# Patient Record
Sex: MALE | Race: WHITE | Employment: FULL TIME | URBAN - NONMETROPOLITAN AREA
[De-identification: names, ages, dates, MRNs, and addresses within clinical notes are randomized per-mention and may not be internally consistent; named-entity substitution may affect disease eponyms.]

---

## 2023-01-25 LAB
ALBUMIN SERPL-MCNC: 4.8 G/DL
ALP BLD-CCNC: 89 U/L
ALT SERPL-CCNC: 20 U/L
ANION GAP SERPL CALCULATED.3IONS-SCNC: 9 MMOL/L
AST SERPL-CCNC: 20 U/L
BILIRUB SERPL-MCNC: 0.6 MG/DL (ref 0.1–1.4)
BUN BLDV-MCNC: 18 MG/DL
CALCIUM SERPL-MCNC: 9.5 MG/DL
CHLORIDE BLD-SCNC: 102 MMOL/L
CO2: 30 MMOL/L
CREAT SERPL-MCNC: 1.1 MG/DL
EGFR: 90
GLUCOSE BLD-MCNC: 94 MG/DL
POTASSIUM SERPL-SCNC: 4.1 MMOL/L
SODIUM BLD-SCNC: 141 MMOL/L
TOTAL PROTEIN: 7.4

## 2023-01-31 LAB
BILIRUBIN, URINE: NEGATIVE
BLOOD, URINE: POSITIVE
CLARITY: CLEAR
COLOR: YELLOW
GLUCOSE URINE: NEGATIVE
KETONES, URINE: NEGATIVE
LEUKOCYTE ESTERASE, URINE: NEGATIVE
NITRITE, URINE: NEGATIVE
PH UA: 5 (ref 4.5–8)
PROTEIN UA: POSITIVE
SPECIFIC GRAVITY, URINE: 1.02
UROBILINOGEN, URINE: NORMAL

## 2023-02-14 LAB
BILIRUBIN, URINE: NEGATIVE
BLOOD, URINE: POSITIVE
CLARITY: CLEAR
COLOR: YELLOW
GLUCOSE URINE: NEGATIVE
KETONES, URINE: NEGATIVE
LEUKOCYTE ESTERASE, URINE: NEGATIVE
NITRITE, URINE: NEGATIVE
PH UA: 7 (ref 4.5–8)
PROTEIN UA: POSITIVE
SPECIFIC GRAVITY, URINE: 1.01
UROBILINOGEN, URINE: NORMAL

## 2023-03-02 ENCOUNTER — HOSPITAL ENCOUNTER (OUTPATIENT)
Dept: ULTRASOUND IMAGING | Age: 28
Discharge: HOME | End: 2023-03-02
Payer: COMMERCIAL

## 2023-03-02 DIAGNOSIS — R31.9 HEMATURIA, UNSPECIFIED TYPE: ICD-10-CM

## 2023-03-02 DIAGNOSIS — R80.9 PROTEINURIA, UNSPECIFIED TYPE: ICD-10-CM

## 2023-03-02 DIAGNOSIS — R03.0 ELEVATED BLOOD PRESSURE READING WITHOUT DIAGNOSIS OF HYPERTENSION: ICD-10-CM

## 2023-03-02 PROCEDURE — 93975 VASCULAR STUDY: CPT

## 2023-03-02 PROCEDURE — 76770 US EXAM ABDO BACK WALL COMP: CPT

## 2023-03-22 LAB
BILIRUBIN, URINE: POSITIVE
BLOOD, URINE: POSITIVE
CLARITY: ABNORMAL
COLOR: YELLOW
GLUCOSE URINE: NEGATIVE
KETONES, URINE: POSITIVE
LEUKOCYTE ESTERASE, URINE: POSITIVE
NITRITE, URINE: NEGATIVE
PH UA: 6.5 (ref 4.5–8)
PROTEIN UA: POSITIVE
SPECIFIC GRAVITY, URINE: 1.02
UROBILINOGEN, URINE: NORMAL

## 2023-03-30 PROBLEM — F32.A DEPRESSIVE DISORDER: Status: ACTIVE | Noted: 2023-03-30

## 2023-03-30 PROBLEM — D17.9 LIPOMA: Status: ACTIVE | Noted: 2023-03-30

## 2023-03-30 PROBLEM — H93.25 AUDITORY PROCESSING DISORDER: Status: ACTIVE | Noted: 2023-03-30

## 2023-03-30 PROBLEM — R31.9 HEMATURIA: Status: ACTIVE | Noted: 2023-03-30

## 2023-03-30 PROBLEM — G47.00 INSOMNIA: Status: ACTIVE | Noted: 2023-03-30

## 2023-03-30 PROBLEM — F84.9 PERVASIVE DEVELOPMENTAL DISORDER: Status: ACTIVE | Noted: 2023-03-30

## 2023-03-30 PROBLEM — R06.83 SNORING: Status: ACTIVE | Noted: 2023-03-30

## 2023-03-30 PROBLEM — R03.0 FINDING OF ABOVE NORMAL BLOOD PRESSURE: Status: ACTIVE | Noted: 2023-03-30

## 2023-03-30 PROBLEM — E78.5 HYPERLIPIDEMIA: Status: ACTIVE | Noted: 2023-03-30

## 2023-03-30 PROBLEM — I10 HYPERTENSION: Status: ACTIVE | Noted: 2023-03-30

## 2023-03-30 PROBLEM — F80.81 STUTTERING: Status: ACTIVE | Noted: 2023-03-30

## 2023-03-30 PROBLEM — R21 RASH: Status: ACTIVE | Noted: 2023-03-30

## 2023-03-30 PROBLEM — R79.89 LOW VITAMIN D LEVEL: Status: ACTIVE | Noted: 2023-03-30

## 2023-03-30 RX ORDER — BUSPIRONE HYDROCHLORIDE 15 MG/1
15 TABLET ORAL 2 TIMES DAILY
COMMUNITY
Start: 2023-02-14

## 2023-03-30 RX ORDER — CLONIDINE HYDROCHLORIDE 0.1 MG/1
0.2 TABLET ORAL 2 TIMES DAILY
COMMUNITY
Start: 2023-01-23

## 2023-03-30 RX ORDER — PAROXETINE HYDROCHLORIDE 20 MG/1
TABLET, FILM COATED ORAL
COMMUNITY
Start: 2023-01-24 | End: 2023-03-31

## 2023-03-30 RX ORDER — DEXTROAMPHETAMINE SACCHARATE, AMPHETAMINE ASPARTATE, DEXTROAMPHETAMINE SULFATE AND AMPHETAMINE SULFATE 3.75; 3.75; 3.75; 3.75 MG/1; MG/1; MG/1; MG/1
TABLET ORAL
COMMUNITY
End: 2023-03-31

## 2023-03-30 RX ORDER — LISINOPRIL 10 MG/1
TABLET ORAL
COMMUNITY
Start: 2023-01-24 | End: 2023-03-31

## 2023-03-30 RX ORDER — AMLODIPINE BESYLATE 5 MG/1
TABLET ORAL 2 TIMES DAILY
COMMUNITY
Start: 2023-01-31 | End: 2023-09-17

## 2023-03-30 RX ORDER — HYDROXYZINE PAMOATE 25 MG/1
CAPSULE ORAL
COMMUNITY
Start: 2022-03-22 | End: 2023-03-31

## 2023-03-31 ENCOUNTER — OFFICE VISIT (OUTPATIENT)
Dept: NEPHROLOGY | Age: 28
End: 2023-03-31
Payer: COMMERCIAL

## 2023-03-31 VITALS
HEART RATE: 97 BPM | SYSTOLIC BLOOD PRESSURE: 155 MMHG | WEIGHT: 215 LBS | DIASTOLIC BLOOD PRESSURE: 103 MMHG | OXYGEN SATURATION: 100 %

## 2023-03-31 DIAGNOSIS — R79.89 ELEVATED SERUM CREATININE: ICD-10-CM

## 2023-03-31 DIAGNOSIS — R76.8 POSITIVE ANA (ANTINUCLEAR ANTIBODY): ICD-10-CM

## 2023-03-31 DIAGNOSIS — R80.9 PROTEINURIA, UNSPECIFIED TYPE: ICD-10-CM

## 2023-03-31 DIAGNOSIS — R31.29 MICROHEMATURIA: ICD-10-CM

## 2023-03-31 DIAGNOSIS — I10 ESSENTIAL HYPERTENSION: Primary | ICD-10-CM

## 2023-03-31 PROCEDURE — 3080F DIAST BP >= 90 MM HG: CPT | Performed by: INTERNAL MEDICINE

## 2023-03-31 PROCEDURE — 99204 OFFICE O/P NEW MOD 45 MIN: CPT | Performed by: INTERNAL MEDICINE

## 2023-03-31 PROCEDURE — 3077F SYST BP >= 140 MM HG: CPT | Performed by: INTERNAL MEDICINE

## 2023-03-31 RX ORDER — LOSARTAN POTASSIUM 50 MG/1
50 TABLET ORAL DAILY
Qty: 90 TABLET | Refills: 1 | Status: SHIPPED | OUTPATIENT
Start: 2023-03-31

## 2023-03-31 NOTE — PROGRESS NOTES
distress, not diaphoretic  Eyes: no icteric sclera in left eye or right eye,  no pallor conjunctiva in left or right eye, no discharge seen from left eye or right eye  Ears and Nose: normal external appearance of left and right ear  Oral: moist oral mucus membranes  Neck: No jugular venous distention, appears symmetric, good ROM  Lungs: Air entry B/L, no crackles or rales  Chest: No chest wall tenderness  Heart: regular rate, S1, S2  Extremities: no LE edema, no tenderness  GI: soft, non-tender, no guarding, no distention  Skin: no rash seen on exposed extremities, warm to touch  Musculo: moves all extremities  Neuro: no slurred speech, no facial drooping  Psychiatric: Normal mood and affect, Not agitated     Laboratory & Diagnostics:  Old progress notes from referring physician reviewed. Radiology/US kidneys:   Echo:   Old labs reviewed:    Jan 2023: K 4.1, Creat 1.1, Albumin 4.8  UA; + large blood, ++ protein, NHUNG (+)     Impression/Plan:   1. HTN: sub-optimally controlled in setting of dipstick proteinuria and  hematuria. NHUNG is positive. Check full serological panel. Check Complements, Anti DsDNA, Anti smith, ANCA, GBM. Check Dopplers renal arteries. Advised low salt diet, weight loss. Increase water intake. Add losartan 50 mg daily. Bring me some BP readings. 2. Positive NHUNG with hematuria and proteinuria: Check full serological panel as above. Patient with positive NHUNG. No know hx lupus. Denies any join pain. Check ESR and CRP  3. Elevated serum creatinine: ?GN vs HTN nephrosclerosis. Has proteinuria, microhematuria and (+) NHUNG. Needs full work-up as above.        Thought process was discussed with the patient  Thank you for the referral  Please do not hesitate to contact me if you have any questions or concerns  I will make further recommendations depending on clinical course and lab/diagnostic results    Orders Placed This Encounter   Procedures    US DUP ABD PEL RETRO SCROT COMPLETE    Basic Metabolic

## 2023-05-12 ENCOUNTER — OFFICE VISIT (OUTPATIENT)
Dept: NEPHROLOGY | Age: 28
End: 2023-05-12
Payer: COMMERCIAL

## 2023-05-12 VITALS
HEART RATE: 91 BPM | OXYGEN SATURATION: 97 % | DIASTOLIC BLOOD PRESSURE: 88 MMHG | WEIGHT: 212 LBS | SYSTOLIC BLOOD PRESSURE: 139 MMHG

## 2023-05-12 DIAGNOSIS — R76.8 ANA POSITIVE: ICD-10-CM

## 2023-05-12 DIAGNOSIS — R31.9 HEMATURIA, UNSPECIFIED TYPE: Primary | ICD-10-CM

## 2023-05-12 DIAGNOSIS — R80.9 PROTEINURIA, UNSPECIFIED TYPE: ICD-10-CM

## 2023-05-12 DIAGNOSIS — I10 ESSENTIAL HYPERTENSION: ICD-10-CM

## 2023-05-12 PROCEDURE — 99214 OFFICE O/P EST MOD 30 MIN: CPT | Performed by: INTERNAL MEDICINE

## 2023-05-12 PROCEDURE — 3074F SYST BP LT 130 MM HG: CPT | Performed by: INTERNAL MEDICINE

## 2023-05-12 PROCEDURE — 3078F DIAST BP <80 MM HG: CPT | Performed by: INTERNAL MEDICINE

## 2023-05-12 RX ORDER — PAROXETINE 10 MG/1
10 TABLET, FILM COATED ORAL EVERY MORNING
COMMUNITY
Start: 2023-04-04

## 2023-05-12 RX ORDER — ERGOCALCIFEROL 1.25 MG/1
50000 CAPSULE ORAL WEEKLY
Qty: 12 CAPSULE | Refills: 0 | Status: SHIPPED | OUTPATIENT
Start: 2023-05-12

## 2023-05-12 RX ORDER — CARVEDILOL 6.25 MG/1
6.25 TABLET ORAL 2 TIMES DAILY
Qty: 60 TABLET | Refills: 3 | Status: SHIPPED | OUTPATIENT
Start: 2023-05-12

## 2023-05-12 NOTE — PROGRESS NOTES
[address] [facility] [address]  Dept: [phone]  Loc: [phone]  Office Progress Note  5/12/2023 12:38 PM      Pt Name:    Segundo Burroughs  YOB: 1995  Primary Care Physician:  Elysia Mullen MD     Chief Complaint:   Chief Complaint   Patient presents with    Follow-up     6 week for hematuria, proteinuria. Background Information/Interval History:   The patient is a 33 y.o. white malepatient who was sent in for evaluation of proteinuria and hematuria. Patient reports history of hypertension for few years. He says atleast 2 years. Says he has been on HTN medications atleast for over a year. Clonidine was started about a year ago. He says mother has high BP. He is on clonidine and norvasc (norvasc was added 2 weeks ago). Had some tests including positive NHUNG. here for follow-up visit. He has no new concerns. No chest pain or shortness of breath. Denies any gross hematuria. No fever or chills. Feels okay otherwise. Past History:  Past Medical History:   Diagnosis Date    Diabetes mellitus ([address]     Hypertension      Past Surgical History:   Procedure Laterality Date    TONGUE SURGERY          VITALS:  /88 (Site: Left Upper Arm, Position: Sitting, Cuff Size: Medium Adult)   Pulse 91   Wt 212 lb (96.2 kg)   SpO2 97%   Wt Readings from Last 3 Encounters:   05/12/23 212 lb (96.2 kg)   03/31/23 215 lb (97.5 kg)     There is no height or weight on file to calculate BMI.      General Appearance: alert and cooperative with exam, appears comfortable, no distress  Oral: moist oral mucus membranes  Neck: No jugular venous distention  Lungs: Air entry B/L, no crackles or rales, no use of accessory muscles  Heart: S1, S2 heard  GI: soft, non-tender, no guarding  Extremities: No sig LE edema     Medications:  Current Outpatient Medications   Medication Sig Dispense Refill    PARoxetine (PAXIL) 10 MG tablet

## 2023-05-23 ENCOUNTER — HOSPITAL ENCOUNTER (OUTPATIENT)
Dept: CT IMAGING | Age: 28
Discharge: HOME | End: 2023-05-23
Payer: COMMERCIAL

## 2023-05-23 VITALS
TEMPERATURE: 98.7 F | BODY MASS INDEX: 35.36 KG/M2 | WEIGHT: 220 LBS | HEIGHT: 66 IN | RESPIRATION RATE: 18 BRPM | DIASTOLIC BLOOD PRESSURE: 70 MMHG | SYSTOLIC BLOOD PRESSURE: 134 MMHG | HEART RATE: 90 BPM | OXYGEN SATURATION: 95 %

## 2023-05-23 DIAGNOSIS — R80.9 PROTEINURIA, UNSPECIFIED TYPE: ICD-10-CM

## 2023-05-23 DIAGNOSIS — I10 ESSENTIAL HYPERTENSION: ICD-10-CM

## 2023-05-23 DIAGNOSIS — R31.9 HEMATURIA, UNSPECIFIED TYPE: ICD-10-CM

## 2023-05-23 LAB
ANION GAP SERPL CALC-SCNC: 12 MEQ/L (ref 8–16)
BACTERIA: ABNORMAL
BILIRUB UR QL STRIP: NEGATIVE
BUN SERPL-MCNC: 22 MG/DL (ref 7–22)
CALCIUM SERPL-MCNC: 9.3 MG/DL (ref 8.5–10.5)
CASTS #/AREA URNS LPF: ABNORMAL /LPF
CASTS #/AREA URNS LPF: ABNORMAL /LPF
CHARACTER UR: CLEAR
CHARCOAL URNS QL MICRO: ABNORMAL
CHLORIDE SERPL-SCNC: 106 MEQ/L (ref 98–111)
CO2 SERPL-SCNC: 24 MEQ/L (ref 23–33)
COLOR UR: YELLOW
CREAT SERPL-MCNC: 1.1 MG/DL (ref 0.4–1.2)
CREAT UR-MCNC: 135 MG/DL
CRYSTALS URNS QL MICRO: ABNORMAL
DEPRECATED RDW RBC AUTO: 37.1 FL (ref 35–45)
EPITHELIAL CELLS, UA: ABNORMAL /HPF
ERYTHROCYTE [DISTWIDTH] IN BLOOD BY AUTOMATED COUNT: 12.3 % (ref 11.5–14.5)
GFR SERPL CREATININE-BSD FRML MDRD: > 60 ML/MIN/1.73M2
GLUCOSE SERPL-MCNC: 117 MG/DL (ref 70–108)
GLUCOSE UR QL STRIP.AUTO: NEGATIVE MG/DL
HCT VFR BLD AUTO: 47.6 % (ref 42–52)
HGB BLD-MCNC: 15.9 GM/DL (ref 14–18)
HGB UR QL STRIP.AUTO: ABNORMAL
KETONES UR QL STRIP.AUTO: NEGATIVE
LEUKOCYTE ESTERASE UR QL STRIP.AUTO: NEGATIVE
MCH RBC QN AUTO: 28 PG (ref 26–33)
MCHC RBC AUTO-ENTMCNC: 33.4 GM/DL (ref 32.2–35.5)
MCV RBC AUTO: 83.8 FL (ref 80–94)
NITRITE UR QL STRIP.AUTO: NEGATIVE
PH UR STRIP.AUTO: 5.5 [PH] (ref 5–9)
PLATELET # BLD AUTO: 302 THOU/MM3 (ref 130–400)
PMV BLD AUTO: 9 FL (ref 9.4–12.4)
POTASSIUM SERPL-SCNC: 4.2 MEQ/L (ref 3.5–5.2)
PROT UR STRIP.AUTO-MCNC: 300 MG/DL
PROT UR-MCNC: 139.7 MG/DL
PROT/CREAT 24H UR: 1.03 MG/G{CREAT}
RBC # BLD AUTO: 5.68 MILL/MM3 (ref 4.7–6.1)
RBC #/AREA URNS HPF: ABNORMAL /HPF
RENAL EPI CELLS #/AREA URNS HPF: ABNORMAL /[HPF]
SODIUM SERPL-SCNC: 142 MEQ/L (ref 135–145)
SP GR UR REFRACT.AUTO: 1.02 (ref 1–1.03)
UROBILINOGEN UR QL STRIP.AUTO: 0.2 EU/DL (ref 0–1)
WBC # BLD AUTO: 7.3 THOU/MM3 (ref 4.8–10.8)
WBC #/AREA URNS HPF: ABNORMAL /HPF
YEAST LIKE FUNGI URNS QL MICRO: ABNORMAL

## 2023-05-23 PROCEDURE — 2580000003 HC RX 258: Performed by: RADIOLOGY

## 2023-05-23 PROCEDURE — 77012 CT SCAN FOR NEEDLE BIOPSY: CPT

## 2023-05-23 PROCEDURE — 50200 RENAL BIOPSY PERQ: CPT

## 2023-05-23 PROCEDURE — 6360000002 HC RX W HCPCS: Performed by: RADIOLOGY

## 2023-05-23 PROCEDURE — 99211 OFF/OP EST MAY X REQ PHY/QHP: CPT

## 2023-05-23 RX ORDER — MIDAZOLAM HYDROCHLORIDE 1 MG/ML
1 INJECTION INTRAMUSCULAR; INTRAVENOUS ONCE
Status: COMPLETED | OUTPATIENT
Start: 2023-05-23 | End: 2023-05-23

## 2023-05-23 RX ORDER — FENTANYL CITRATE 50 UG/ML
50 INJECTION, SOLUTION INTRAMUSCULAR; INTRAVENOUS ONCE
Status: COMPLETED | OUTPATIENT
Start: 2023-05-23 | End: 2023-05-23

## 2023-05-23 RX ORDER — SODIUM CHLORIDE 450 MG/100ML
INJECTION, SOLUTION INTRAVENOUS CONTINUOUS
Status: DISCONTINUED | OUTPATIENT
Start: 2023-05-23 | End: 2023-05-24 | Stop reason: HOSPADM

## 2023-05-23 RX ADMIN — FENTANYL CITRATE 50 MCG: 0.05 INJECTION, SOLUTION INTRAMUSCULAR; INTRAVENOUS at 08:39

## 2023-05-23 RX ADMIN — MIDAZOLAM 1 MG: 1 INJECTION INTRAMUSCULAR; INTRAVENOUS at 08:40

## 2023-05-23 RX ADMIN — SODIUM CHLORIDE: 4.5 INJECTION, SOLUTION INTRAVENOUS at 07:24

## 2023-05-23 ASSESSMENT — PAIN SCALES - GENERAL
PAINLEVEL_OUTOF10: 0
PAINLEVEL_OUTOF10: 5
PAINLEVEL_OUTOF10: 0
PAINLEVEL_OUTOF10: 0
PAINLEVEL_OUTOF10: 2

## 2023-05-23 ASSESSMENT — PAIN DESCRIPTION - LOCATION
LOCATION: HEAD
LOCATION: HEAD

## 2023-05-23 NOTE — PROGRESS NOTES
0700 pt arrives ambulatory with  for renal bx. Procedure explained and questions answered. PT RIGHTS AND RESPONSIBILITIES OFFERED TO PT. Pt denies taking blood thinners for past 5 days. Pt has been NPO since 5/22/23.  0725 labs drawn and urine sample sent down as ordered. 0800 pt to CT via bed.  0920 pt back from ct. Vitals stable. Pt denies pain at bx site. Site on left back lateral bandaid clean, dry and intact. No bleeding drainage redness or swelling noted. Pt provided with muffin and water. 0945 vitals stable. Site unchanged. Site soft and non-tender. 1000 vitals stable. Site unchanged. Pt tolerating snack.  at bedside. 1024 vitals stable. Site unchanged. Pt denies needs. Pt denies pain. 1120 vitals stable. Pt denies needs. 1230 pt resting quietly. Denies needs. Vitals stable. Site unchanged. Pt denies need to use restroom. [address] Carlos Daniel in to see pt. Okay to discharge. 1315 pt discharged via wheelchair with  with instructions with no complaints. Bx site on left lateral back clean, dry and intact. No bleeding drainage redness or swelling noted.                  __m__ Safety:       (Environmental)  [place] to environment  Ensure ID band is correct and in place/ allergy band as needed  Assess for fall risk  Initiate fall precautions as applicable (fall band, side rails, etc.)  Call light within reach  Bed in low position/ wheels locked    __m__ Pain:       Assess pain level and characteristics  Administer analgesics as ordered  Assess effectiveness of pain management and report to MD as needed    __m__ Knowledge Deficit:  Assess baseline knowledge  Provide teaching at level of understanding  Provide teaching via preferred learning method  Evaluate teaching effectiveness    __m__ Hemodynamic/Respiratory Status:       (Pre and Post Procedure Monitoring)  Assess/Monitor vital signs and LOC  Assess Baseline SpO2 prior to any sedation  Obtain weight/height  Assess vital signs/ LOC

## 2023-05-23 NOTE — POST SEDATION
Sedation Post Procedure Note    Patient Name: Leva Hatchet   YOB: 1995  Room/Bed: Room/bed info not found  Medical Record Number: [id number]  Date: 5/23/2023   Time: 9:18 AM         Physicians/Assistants: Pascale Abarca DO, MD    Procedure Performed:  CT guided random renal biopsy left kidney    Post-Sedation Vital Signs:  Vitals:    05/23/23 0914   BP: (!) 155/97   Pulse: 96   Resp: 22   Temp:    SpO2: 97%      Vital signs were reviewed and were stable after the procedure (see flow sheet for vitals)            Post-Sedation Exam: stable           Complications: none    Electronically signed by Pascale Abarca DO on 5/23/2023 at 9:18 AM

## 2023-05-23 NOTE — PROGRESS NOTES
8700 Patient received in CT scanning for pre-procedure assessment. Monitor applied. 6011 Dr. Ian Rosenbaum here; spoke to patient. This procedure has been fully reviewed with the patient and written informed consent has been obtained. 7760 Patient assisted to CT table and pre scan started. 7939 Procedure started with Dr. Ian Rosenbaum. 7017 Samples collected and given to pathologist for further review. Surgigel slurry injected in needle tract. 8466 Procedure completed; patient tolerated well. Post scan obtained. 0912 Bacitracin oint, band aid to site; no bleeding noted. [id number] Patient on cart; comfort ensured. 0730 Report called to OPN and patient taken to OPN via cart.

## 2023-05-23 NOTE — H&P
[address] [address]  Sedation/Analgesia History & Physical    Pt Name: Perla Leigh  MRN: [id number]  YOB: 1995  Provider Performing Procedure: Viky Carrera MD  Primary Care Physician: Sadia Rowley MD    Formulation and discussion of sedation / procedure plans, risks, benefits, side effects and alternatives with patient and/or responsible adult completed. PRE-PROCEDURE   DNR-CCA/DNR-CC []Yes [x]No  Brief History/Pre-Procedure Diagnosis: Proteinuria/hematuria          MEDICAL HISTORY  []CAD/Valve  []Liver Disease  []Lung Disease [x]Diabetes  [x]Hypertension []Renal Disease  []Additional information:       has a past medical history of Diabetes mellitus ([facility] [address] and Hypertension. SURGICAL HISTORY   has a past surgical history that includes Tongue surgery.   Additional information:       ALLERGIES   Allergies as of 05/23/2023    (No Known Allergies)     Additional information:       MEDICATIONS   Coumadin Use Last 5 Days [x]No []Yes  Antiplatelet drug therapy use last 5 days  [x]No []Yes  Other anticoagulant use last 5 days  [x]No []Yes    Current Outpatient Medications:     PARoxetine (PAXIL) 10 MG tablet, Take 1 tablet by mouth every morning, Disp: , Rfl:     carvedilol (COREG) 6.25 MG tablet, Take 1 tablet by mouth 2 times daily, Disp: 60 tablet, Rfl: 3    vitamin D (ERGOCALCIFEROL) 1.25 MG (06229 UT) CAPS capsule, Take 1 capsule by mouth once a week, Disp: 12 capsule, Rfl: 0    losartan (COZAAR) 50 MG tablet, Take 1 tablet by mouth daily (Patient taking differently: Take 2 tablets by mouth daily), Disp: 90 tablet, Rfl: 1    busPIRone (BUSPAR) 15 MG tablet, Take 15 mg by mouth in the morning and at bedtime, Disp: , Rfl:     cloNIDine (CATAPRES) 0.1 MG tablet, Take 2 tablets by mouth 2 times daily, Disp: , Rfl:     Current Facility-Administered Medications:     0.45 % sodium chloride infusion, , IntraVENous, Continuous, Juma Ramsey DO, Last Rate: 20 mL/hr at 05/23/23 0724, New

## 2023-05-25 NOTE — PROGRESS NOTES
[address] [place] [facility] pt for follow-up CT guided random renal biopsy. Pt states he is doing well. Denies any problems at biopsy site and states he is just having a little pain in his lower back. Offers no complaints.

## 2023-05-31 LAB — MISC REFERENCE: NORMAL

## 2023-06-06 ENCOUNTER — TELEPHONE (OUTPATIENT)
Dept: NEPHROLOGY | Age: 28
End: 2023-06-06

## 2023-06-06 NOTE — TELEPHONE ENCOUNTER
----- Message from Sudha Justice MD sent at 6/4/2023  6:20 PM EDT -----  Can you please call renal pathology dept [facility] and obtain another copy of kidney biopsy report? The one in chart is missing some pages.    Phone [phone]

## 2023-06-07 ENCOUNTER — TELEPHONE (OUTPATIENT)
Dept: NEPHROLOGY | Age: 28
End: 2023-06-07

## 2023-06-07 ENCOUNTER — OFFICE VISIT (OUTPATIENT)
Dept: NEPHROLOGY | Age: 28
End: 2023-06-07
Payer: COMMERCIAL

## 2023-06-07 VITALS
DIASTOLIC BLOOD PRESSURE: 81 MMHG | OXYGEN SATURATION: 98 % | WEIGHT: 211 LBS | HEART RATE: 81 BPM | SYSTOLIC BLOOD PRESSURE: 120 MMHG | BODY MASS INDEX: 34.06 KG/M2

## 2023-06-07 DIAGNOSIS — I10 PRIMARY HYPERTENSION: ICD-10-CM

## 2023-06-07 DIAGNOSIS — N02.8 IGA NEPHROPATHY DETERMINED BY BIOPSY OF KIDNEY: Primary | ICD-10-CM

## 2023-06-07 LAB
ALBUMIN SERPL-MCNC: 4.4 G/DL
ALP BLD-CCNC: 91 U/L
ALT SERPL-CCNC: 11 U/L
ANION GAP SERPL CALCULATED.3IONS-SCNC: 9 MMOL/L
AST SERPL-CCNC: 12 U/L
BASOPHILS ABSOLUTE: NORMAL
BASOPHILS RELATIVE PERCENT: NORMAL
BILIRUB SERPL-MCNC: 0.3 MG/DL (ref 0.1–1.4)
BILIRUBIN, URINE: NEGATIVE
BLOOD, URINE: POSITIVE
BUN BLDV-MCNC: 19 MG/DL
CALCIUM SERPL-MCNC: 9.3 MG/DL
CHLORIDE BLD-SCNC: 103 MMOL/L
CLARITY: CLEAR
CO2: 28 MMOL/L
COLOR: YELLOW
CREAT SERPL-MCNC: 1.1 MG/DL
EGFR: >90
EOSINOPHILS ABSOLUTE: NORMAL
EOSINOPHILS RELATIVE PERCENT: NORMAL
GLUCOSE BLD-MCNC: 98 MG/DL
GLUCOSE URINE: NEGATIVE
HCT VFR BLD CALC: 42.7 % (ref 41–53)
HEMOGLOBIN: 14.6 G/DL (ref 13.5–17.5)
KETONES, URINE: NEGATIVE
LEUKOCYTE ESTERASE, URINE: NEGATIVE
LYMPHOCYTES ABSOLUTE: NORMAL
LYMPHOCYTES RELATIVE PERCENT: NORMAL
MCH RBC QN AUTO: NORMAL PG
MCHC RBC AUTO-ENTMCNC: NORMAL G/DL
MCV RBC AUTO: NORMAL FL
MONOCYTES ABSOLUTE: NORMAL
MONOCYTES RELATIVE PERCENT: NORMAL
NEUTROPHILS ABSOLUTE: NORMAL
NEUTROPHILS RELATIVE PERCENT: NORMAL
NITRITE, URINE: NEGATIVE
PDW BLD-RTO: NORMAL %
PH UA: 5.5 (ref 4.5–8)
PLATELET # BLD: 304 K/ΜL
PMV BLD AUTO: NORMAL FL
POTASSIUM SERPL-SCNC: 4.2 MMOL/L
PROTEIN UA: POSITIVE
RBC # BLD: 5.19 10^6/ΜL
SODIUM BLD-SCNC: 140 MMOL/L
SPECIFIC GRAVITY, URINE: >1.03
TOTAL PROTEIN: 6.9
UROBILINOGEN, URINE: ABNORMAL
WBC # BLD: 6.9 10^3/ML

## 2023-06-07 PROCEDURE — 3074F SYST BP LT 130 MM HG: CPT | Performed by: INTERNAL MEDICINE

## 2023-06-07 PROCEDURE — 99214 OFFICE O/P EST MOD 30 MIN: CPT | Performed by: INTERNAL MEDICINE

## 2023-06-07 PROCEDURE — 3078F DIAST BP <80 MM HG: CPT | Performed by: INTERNAL MEDICINE

## 2023-06-07 RX ORDER — CARVEDILOL 12.5 MG/1
12.5 TABLET ORAL 2 TIMES DAILY
Qty: 60 TABLET | Refills: 3 | Status: SHIPPED | OUTPATIENT
Start: 2023-06-07

## 2023-06-07 RX ORDER — LOSARTAN POTASSIUM 100 MG/1
100 TABLET ORAL DAILY
Qty: 90 TABLET | Refills: 0 | COMMUNITY
Start: 2023-06-07

## 2023-06-07 NOTE — PROGRESS NOTES
[address]  [address]  Dept: [phone]  Loc: [phone]  Office Progress Note  6/7/2023 1:18 PM      Pt Name:    Ca Padron  YOB: 1995  Primary Care Physician:  Susannah Voss MD     Chief Complaint:   Chief Complaint   Patient presents with    Follow-up     for hematuria, proteinuria, review kidney biopsy results. Background Information/Interval History:   The patient is a 33 y.o. white malepatient with proteinuria and hematuria. Patient reports history of hypertension for few years. He says atleast 2 years. He says mother has high BP. Here for follow-up. Feels well. No nausea, fever or chills. No gross hematuria. No leg swelling. Says BP has been in 135-153/95 range. Underwent kidney biopsy recently and now here to discuss further plan of care. Past History:  Past Medical History:   Diagnosis Date    Diabetes mellitus ([address]     Hypertension      Past Surgical History:   Procedure Laterality Date    CT BIOPSY RENAL  5/23/2023    CT BIOPSY RENAL 5/23/2023 [facility] CT SCAN    TONGUE SURGERY          VITALS:  /81   Pulse 81   Wt 211 lb (95.7 kg)   SpO2 98%   BMI 34.06 kg/m²   Wt Readings from Last 3 Encounters:   06/07/23 211 lb (95.7 kg)   05/23/23 220 lb (99.8 kg)   05/12/23 212 lb (96.2 kg)     Body mass index is 34.06 kg/m².      General Appearance: alert and cooperative with exam, appears comfortable, no distress  Oral: moist oral mucus membranes  Neck: No jugular venous distention  Lungs: Air entry B/L, no crackles or rales, no use of accessory muscles  Heart: S1, S2 heard  GI: soft, non-tender, no guarding  Extremities: No sig LE edema     Medications:  Current Outpatient Medications   Medication Sig Dispense Refill    PARoxetine (PAXIL) 10 MG tablet Take 2 tablets by mouth every morning      carvedilol (COREG) 6.25 MG tablet Take 1 tablet by mouth 2 times

## 2023-06-08 ENCOUNTER — TELEPHONE (OUTPATIENT)
Dept: NEPHROLOGY | Age: 28
End: 2023-06-08

## 2023-06-08 DIAGNOSIS — N02.8 IGA NEPHROPATHY: Primary | ICD-10-CM

## 2023-06-08 RX ORDER — PREDNISONE 50 MG/1
60 TABLET ORAL DAILY
COMMUNITY
End: 2023-06-08 | Stop reason: DRUGHIGH

## 2023-06-08 RX ORDER — CALCIUM CARBONATE 500(1250)
500 TABLET ORAL 2 TIMES DAILY
COMMUNITY
End: 2023-06-08 | Stop reason: SDUPTHER

## 2023-06-08 RX ORDER — FAMOTIDINE 20 MG/1
20 TABLET, FILM COATED ORAL DAILY
COMMUNITY
End: 2023-06-08 | Stop reason: SDUPTHER

## 2023-06-08 RX ORDER — FAMOTIDINE 20 MG/1
20 TABLET, FILM COATED ORAL DAILY
Qty: 30 TABLET | Refills: 3 | Status: SHIPPED | OUTPATIENT
Start: 2023-06-08

## 2023-06-08 RX ORDER — PREDNISONE 50 MG/1
TABLET ORAL
Qty: 30 TABLET | Refills: 3 | Status: CANCELLED | OUTPATIENT
Start: 2023-06-08

## 2023-06-08 RX ORDER — PREDNISONE 20 MG/1
60 TABLET ORAL DAILY
Qty: 90 TABLET | Refills: 1 | Status: SHIPPED | OUTPATIENT
Start: 2023-06-08

## 2023-06-08 RX ORDER — CALCIUM CARBONATE 500(1250)
500 TABLET ORAL 2 TIMES DAILY
Qty: 60 TABLET | Refills: 3 | Status: SHIPPED | OUTPATIENT
Start: 2023-06-08

## 2023-06-08 NOTE — TELEPHONE ENCOUNTER
----- Message from Juan Valdez MD sent at 6/8/2023 12:54 PM EDT -----  Start prednisone 60 mg daily  Add oscal 500 mg BID  Add pepcid 20 mg daily  Check BMP, UA, UPCR in 2 weeks.

## 2023-06-08 NOTE — TELEPHONE ENCOUNTER
Spoke to pt, he understood the medication changes and to do labs in 2 weeks.     Lab orders and script pending

## 2023-06-08 NOTE — RESULT ENCOUNTER NOTE
Start prednisone 60 mg daily  Add oscal 500 mg BID  Add pepcid 20 mg daily  Check BMP, UA, UPCR in 2 weeks.

## 2023-06-28 LAB
BILIRUBIN, URINE: NEGATIVE
BLOOD, URINE: POSITIVE
BUN BLDV-MCNC: 24 MG/DL
CALCIUM SERPL-MCNC: 9.4 MG/DL
CHLORIDE BLD-SCNC: 101 MMOL/L
CLARITY: CLEAR
CO2: 31 MMOL/L
COLOR: YELLOW
CREAT SERPL-MCNC: 1.1 MG/DL
EGFR: >=90
GLUCOSE BLD-MCNC: 84 MG/DL
GLUCOSE URINE: ABNORMAL
KETONES, URINE: NEGATIVE
LEUKOCYTE ESTERASE, URINE: NEGATIVE
NITRITE, URINE: NEGATIVE
PH UA: 6 (ref 4.5–8)
POTASSIUM SERPL-SCNC: 3.6 MMOL/L
PROTEIN UA: POSITIVE
SODIUM BLD-SCNC: 144 MMOL/L
SPECIFIC GRAVITY, URINE: >=1.03
UROBILINOGEN, URINE: NORMAL

## 2023-07-05 ENCOUNTER — OFFICE VISIT (OUTPATIENT)
Dept: NEPHROLOGY | Age: 28
End: 2023-07-05
Payer: COMMERCIAL

## 2023-07-05 VITALS
OXYGEN SATURATION: 98 % | HEART RATE: 94 BPM | SYSTOLIC BLOOD PRESSURE: 143 MMHG | DIASTOLIC BLOOD PRESSURE: 100 MMHG | WEIGHT: 221 LBS | BODY MASS INDEX: 35.67 KG/M2

## 2023-07-05 DIAGNOSIS — N02.8 IGA NEPHROPATHY: Primary | ICD-10-CM

## 2023-07-05 DIAGNOSIS — I10 PRIMARY HYPERTENSION: ICD-10-CM

## 2023-07-05 PROCEDURE — 3074F SYST BP LT 130 MM HG: CPT | Performed by: INTERNAL MEDICINE

## 2023-07-05 PROCEDURE — 99214 OFFICE O/P EST MOD 30 MIN: CPT | Performed by: INTERNAL MEDICINE

## 2023-07-05 PROCEDURE — 3078F DIAST BP <80 MM HG: CPT | Performed by: INTERNAL MEDICINE

## 2023-07-05 RX ORDER — CARVEDILOL 25 MG/1
25 TABLET ORAL 2 TIMES DAILY
Qty: 180 TABLET | Refills: 3 | Status: SHIPPED | OUTPATIENT
Start: 2023-07-05

## 2023-07-05 RX ORDER — PREDNISONE 20 MG/1
40 TABLET ORAL DAILY
Qty: 90 TABLET | Refills: 1 | Status: SHIPPED
Start: 2023-07-05

## 2023-07-05 RX ORDER — CLONIDINE HYDROCHLORIDE 0.2 MG/1
0.2 TABLET ORAL 3 TIMES DAILY
Qty: 270 TABLET | Refills: 3 | Status: SHIPPED | OUTPATIENT
Start: 2023-07-05

## 2023-07-06 ENCOUNTER — OFFICE VISIT (OUTPATIENT)
Dept: RHEUMATOLOGY | Age: 28
End: 2023-07-06
Payer: COMMERCIAL

## 2023-07-06 ENCOUNTER — NURSE ONLY (OUTPATIENT)
Dept: LAB | Age: 28
End: 2023-07-06

## 2023-07-06 VITALS
WEIGHT: 221.8 LBS | HEIGHT: 66 IN | OXYGEN SATURATION: 97 % | DIASTOLIC BLOOD PRESSURE: 92 MMHG | BODY MASS INDEX: 35.65 KG/M2 | SYSTOLIC BLOOD PRESSURE: 148 MMHG | HEART RATE: 90 BPM

## 2023-07-06 DIAGNOSIS — N02.8 IGA NEPHROPATHY: ICD-10-CM

## 2023-07-06 DIAGNOSIS — D69.2 PURPURA (HCC): ICD-10-CM

## 2023-07-06 DIAGNOSIS — R76.8 POSITIVE ANA (ANTINUCLEAR ANTIBODY): Primary | ICD-10-CM

## 2023-07-06 DIAGNOSIS — R76.8 POSITIVE ANA (ANTINUCLEAR ANTIBODY): ICD-10-CM

## 2023-07-06 LAB
BASOPHILS ABSOLUTE: 0 THOU/MM3 (ref 0–0.1)
BASOPHILS NFR BLD AUTO: 0.3 %
DEPRECATED RDW RBC AUTO: 41.2 FL (ref 35–45)
EOSINOPHIL NFR BLD AUTO: 0.4 %
EOSINOPHILS ABSOLUTE: 0.1 THOU/MM3 (ref 0–0.4)
ERYTHROCYTE [DISTWIDTH] IN BLOOD BY AUTOMATED COUNT: 12.8 % (ref 11.5–14.5)
ERYTHROCYTE [SEDIMENTATION RATE] IN BLOOD BY WESTERGREN METHOD: 1 MM/HR (ref 0–10)
HCT VFR BLD AUTO: 43.3 % (ref 42–52)
HGB BLD-MCNC: 13.6 GM/DL (ref 14–18)
IMM GRANULOCYTES # BLD AUTO: 0.17 THOU/MM3 (ref 0–0.07)
IMM GRANULOCYTES NFR BLD AUTO: 1.3 %
LYMPHOCYTES ABSOLUTE: 1.9 THOU/MM3 (ref 1–4.8)
LYMPHOCYTES NFR BLD AUTO: 14.3 %
MCH RBC QN AUTO: 27.8 PG (ref 26–33)
MCHC RBC AUTO-ENTMCNC: 31.4 GM/DL (ref 32.2–35.5)
MCV RBC AUTO: 88.4 FL (ref 80–94)
MONOCYTES ABSOLUTE: 0.5 THOU/MM3 (ref 0.4–1.3)
MONOCYTES NFR BLD AUTO: 4 %
NEUTROPHILS NFR BLD AUTO: 79.7 %
NRBC BLD AUTO-RTO: 0 /100 WBC
PLATELET # BLD AUTO: 210 THOU/MM3 (ref 130–400)
PMV BLD AUTO: 8.9 FL (ref 9.4–12.4)
RBC # BLD AUTO: 4.9 MILL/MM3 (ref 4.7–6.1)
SEGMENTED NEUTROPHILS ABSOLUTE COUNT: 10.8 THOU/MM3 (ref 1.8–7.7)
WBC # BLD AUTO: 13.6 THOU/MM3 (ref 4.8–10.8)

## 2023-07-06 PROCEDURE — 3077F SYST BP >= 140 MM HG: CPT | Performed by: INTERNAL MEDICINE

## 2023-07-06 PROCEDURE — 3080F DIAST BP >= 90 MM HG: CPT | Performed by: INTERNAL MEDICINE

## 2023-07-06 PROCEDURE — 99204 OFFICE O/P NEW MOD 45 MIN: CPT | Performed by: INTERNAL MEDICINE

## 2023-07-06 ASSESSMENT — ENCOUNTER SYMPTOMS
BLOOD IN STOOL: 0
VOMITING: 0
SHORTNESS OF BREATH: 1
ABDOMINAL PAIN: 0
COUGH: 0
NAUSEA: 0
WHEEZING: 0

## 2023-07-06 NOTE — PROGRESS NOTES
[address] [facility]   Rheumatology Clinic Note      7/6/2023       Reason for Consult:  positive NHUNG  Requesting Physician:  Julio Bailey MD     CHIEF COMPLAINT:    Chief Complaint   Patient presents with    New Patient     Positive NHUNG    Other     Pt developed rashes on bilateral legs with swelling of RT foot and hand after margaret Covid. It also reoccurred when the pt received the vaccines. Pt has since then developed rashes on bilateral arms and hands. HISTORY OF PRESENT ILLNESS:    33 y.o. male presents today for evaluation of positive NHUNG. He was recently diagnosed with IgA nephropathy, biopsy proven. Was on prednisone 60 mg that was decreased recently to 40 mg daily. He follows with Dr. Barbara Pham in Nov 2020. Developed erythematous maculopapular rash in lower extremities (from thighs to feet). Had COVID vaccine July 2021 and Booster in Sept 2022. Each time had rash in lower extremities that lasted for 5 days. Pt brings with him photographs of these rashes. Has phostosentivity. No oral ulcers. Had joint pain when he had COVID infection. This was associated with swelling in right hand and right foot. No recurrence since then. SOB with minimal exertion        Past Medical History:     has a past medical history of Diabetes mellitus ([address] ) and Hypertension. Past Surgical History:     has a past surgical history that includes Tongue surgery and CT BIOPSY RENAL (5/23/2023).     Current Medications:      Current Outpatient Medications:     predniSONE (DELTASONE) 20 MG tablet, Take 2 tablets by mouth daily, Disp: 90 tablet, Rfl: 1    carvedilol (COREG) 25 MG tablet, Take 1 tablet by mouth 2 times daily, Disp: 180 tablet, Rfl: 3    cloNIDine (CATAPRES) 0.2 MG tablet, Take 1 tablet by mouth in the morning, at noon, and at bedtime, Disp: 270 tablet, Rfl: 3    calcium carbonate (OSCAL) 500 MG TABS tablet, Take 1 tablet by mouth 2 times daily, Disp: 60 tablet, Rfl: 3

## 2023-07-07 LAB
C3C SERPL-MCNC: 137 MG/DL (ref 90–180)
C4 SERPL-MCNC: 19 MG/DL (ref 10–40)

## 2023-07-25 ENCOUNTER — OFFICE VISIT (OUTPATIENT)
Dept: RHEUMATOLOGY | Age: 28
End: 2023-07-25
Payer: COMMERCIAL

## 2023-07-25 VITALS
OXYGEN SATURATION: 98 % | HEART RATE: 81 BPM | SYSTOLIC BLOOD PRESSURE: 128 MMHG | WEIGHT: 221 LBS | BODY MASS INDEX: 35.52 KG/M2 | DIASTOLIC BLOOD PRESSURE: 100 MMHG | HEIGHT: 66 IN

## 2023-07-25 DIAGNOSIS — N02.8 IGA NEPHROPATHY: ICD-10-CM

## 2023-07-25 DIAGNOSIS — D69.0 HSP (HENOCH SCHONLEIN PURPURA) (HCC): ICD-10-CM

## 2023-07-25 DIAGNOSIS — L97.922 NONHEALING ULCER OF LEFT LOWER EXTREMITY WITH FAT LAYER EXPOSED (HCC): Primary | ICD-10-CM

## 2023-07-25 LAB
ALBUMIN SERPL-MCNC: 4.5 G/DL
ALP BLD-CCNC: 66 U/L
ALT SERPL-CCNC: 38 U/L
ANION GAP SERPL CALCULATED.3IONS-SCNC: 14 MMOL/L
AST SERPL-CCNC: 16 U/L
BASOPHILS ABSOLUTE: 0.5 /ΜL
BASOPHILS RELATIVE PERCENT: 0.1 %
BILIRUB SERPL-MCNC: 0.5 MG/DL (ref 0.1–1.4)
BILIRUBIN, URINE: NEGATIVE
BLOOD, URINE: POSITIVE
BUN BLDV-MCNC: 19 MG/DL
CALCIUM SERPL-MCNC: 9.6 MG/DL
CHLORIDE BLD-SCNC: 96 MMOL/L
CHOLESTEROL, TOTAL: 287 MG/DL
CHOLESTEROL/HDL RATIO: 66
CLARITY: CLEAR
CO2: 28 MMOL/L
COLOR: YELLOW
CREAT SERPL-MCNC: 0.9 MG/DL
EGFR: >=90
EOSINOPHILS ABSOLUTE: 0.5 /ΜL
EOSINOPHILS RELATIVE PERCENT: 0.1 %
GLUCOSE BLD-MCNC: 69 MG/DL
GLUCOSE URINE: ABNORMAL
HCT VFR BLD CALC: 43.8 % (ref 41–53)
HDLC SERPL-MCNC: 66 MG/DL (ref 35–70)
HEMOGLOBIN: 14.4 G/DL (ref 13.5–17.5)
KETONES, URINE: NEGATIVE
LDL CHOLESTEROL CALCULATED: NORMAL
LEUKOCYTE ESTERASE, URINE: NEGATIVE
LYMPHOCYTES ABSOLUTE: 29.1 /ΜL
LYMPHOCYTES RELATIVE PERCENT: 4.4 %
MCH RBC QN AUTO: 28.2 PG
MCHC RBC AUTO-ENTMCNC: 32.9 G/DL
MCV RBC AUTO: 85.7 FL
MONOCYTES ABSOLUTE: 7 /ΜL
MONOCYTES RELATIVE PERCENT: 1.1 %
NEUTROPHILS ABSOLUTE: 60.1 /ΜL
NEUTROPHILS RELATIVE PERCENT: 9 %
NITRITE, URINE: NEGATIVE
NONHDLC SERPL-MCNC: NORMAL MG/DL
PH UA: 6 (ref 4.5–8)
PLATELET # BLD: 216 K/ΜL
PMV BLD AUTO: 8.3 FL
POTASSIUM SERPL-SCNC: 4.1 MMOL/L
PROTEIN UA: POSITIVE
RBC # BLD: 5.11 10^6/ΜL
SODIUM BLD-SCNC: 138 MMOL/L
SPECIFIC GRAVITY, URINE: 1.02
TOTAL PROTEIN: 6.6
TRIGL SERPL-MCNC: 483 MG/DL
UROBILINOGEN, URINE: ABNORMAL
VITAMIN D 25-HYDROXY: 22
VITAMIN D2, 25 HYDROXY: NORMAL
VITAMIN D3,25 HYDROXY: NORMAL
VLDLC SERPL CALC-MCNC: NORMAL MG/DL
WBC # BLD: 15 10^3/ML

## 2023-07-25 PROCEDURE — 99214 OFFICE O/P EST MOD 30 MIN: CPT | Performed by: INTERNAL MEDICINE

## 2023-07-25 PROCEDURE — 3080F DIAST BP >= 90 MM HG: CPT | Performed by: INTERNAL MEDICINE

## 2023-07-25 PROCEDURE — 3074F SYST BP LT 130 MM HG: CPT | Performed by: INTERNAL MEDICINE

## 2023-07-25 ASSESSMENT — ENCOUNTER SYMPTOMS
VOMITING: 0
ABDOMINAL PAIN: 0
SHORTNESS OF BREATH: 1
NAUSEA: 0
COUGH: 0

## 2023-07-25 NOTE — PROGRESS NOTES
[address] [facility]   Rheumatology Clinic Note      7/25/2023       CHIEF COMPLAINT:    Chief Complaint   Patient presents with    Follow-up     2 wk f/u Positive NHUNG (antinuclear antibody)    Other     Pt is tolerating well. HISTORY OF PRESENT ILLNESS:    33 y.o. male presents today for evaluation of positive NHUNG. In last office visit, labs were ordered for further evaluation. He was recently diagnosed with IgA nephropathy, biopsy proven. Was on prednisone 60 mg that was decreased recently to 40 mg daily. He follows with Dr. Donte Ogden.    No new complaints. Continues to have ulcers on his legs and upper arms. RECAP:  Had COVID in Nov 2020. Developed erythematous maculopapular rash in lower extremities (from thighs to feet). Had COVID vaccine July 2021 and Booster in Sept 2022. Each time had rash in lower extremities that lasted for 5 days. Pt brings with him photographs of these rashes. Has phostosentivity. No oral ulcers. Had joint pain when he had COVID infection. This was associated with swelling in right hand and right foot. No recurrence since then. SOB with minimal exertion      Past Medical History:     has a past medical history of Diabetes mellitus ([address]) and Hypertension. Past Surgical History:     has a past surgical history that includes Tongue surgery and CT BIOPSY RENAL (5/23/2023).     Current Medications:      Current Outpatient Medications:     predniSONE (DELTASONE) 20 MG tablet, Take 2 tablets by mouth daily, Disp: 90 tablet, Rfl: 1    carvedilol (COREG) 25 MG tablet, Take 1 tablet by mouth 2 times daily, Disp: 180 tablet, Rfl: 3    cloNIDine (CATAPRES) 0.2 MG tablet, Take 1 tablet by mouth in the morning, at noon, and at bedtime, Disp: 270 tablet, Rfl: 3    calcium carbonate (OSCAL) 500 MG TABS tablet, Take 1 tablet by mouth 2 times daily, Disp: 60 tablet, Rfl: 3    famotidine (PEPCID) 20 MG tablet, Take 1 tablet by mouth daily, Disp: 30 tablet, Rfl: 3    losartan

## 2023-07-26 ENCOUNTER — TELEPHONE (OUTPATIENT)
Dept: NEPHROLOGY | Age: 28
End: 2023-07-26

## 2023-07-26 DIAGNOSIS — I10 ESSENTIAL HYPERTENSION: Primary | ICD-10-CM

## 2023-07-26 DIAGNOSIS — N02.8 IGA NEPHROPATHY: ICD-10-CM

## 2023-07-26 NOTE — TELEPHONE ENCOUNTER
Spoke to pt, he stated that he understands to repeat labs 2 weeks before his next appt. He also informed me that he broke his rule and ate beef jerky the night before.      Lab orders pending

## 2023-07-26 NOTE — TELEPHONE ENCOUNTER
----- Message from Kassi Amador MD sent at 7/26/2023 12:42 PM EDT -----  Repeat CBC, BMP, UA, UPCR in 2 weeks just before next visit.

## 2023-08-02 ENCOUNTER — OFFICE VISIT (OUTPATIENT)
Dept: NEPHROLOGY | Age: 28
End: 2023-08-02
Payer: COMMERCIAL

## 2023-08-02 VITALS
WEIGHT: 223 LBS | OXYGEN SATURATION: 99 % | HEART RATE: 95 BPM | DIASTOLIC BLOOD PRESSURE: 83 MMHG | SYSTOLIC BLOOD PRESSURE: 132 MMHG | BODY MASS INDEX: 35.99 KG/M2

## 2023-08-02 DIAGNOSIS — N02.8 IGA NEPHROPATHY: Primary | ICD-10-CM

## 2023-08-02 DIAGNOSIS — I10 ESSENTIAL HYPERTENSION: ICD-10-CM

## 2023-08-02 DIAGNOSIS — E55.9 VITAMIN D DEFICIENCY: ICD-10-CM

## 2023-08-02 PROCEDURE — 99214 OFFICE O/P EST MOD 30 MIN: CPT | Performed by: INTERNAL MEDICINE

## 2023-08-02 PROCEDURE — 3074F SYST BP LT 130 MM HG: CPT | Performed by: INTERNAL MEDICINE

## 2023-08-02 PROCEDURE — 3078F DIAST BP <80 MM HG: CPT | Performed by: INTERNAL MEDICINE

## 2023-08-02 RX ORDER — SULFAMETHOXAZOLE AND TRIMETHOPRIM 800; 160 MG/1; MG/1
1 TABLET ORAL
Qty: 15 TABLET | Refills: 3 | Status: SHIPPED | OUTPATIENT
Start: 2023-08-02

## 2023-08-03 LAB
BASOPHILS ABSOLUTE: NORMAL
BASOPHILS RELATIVE PERCENT: NORMAL
BILIRUBIN, URINE: NEGATIVE
BLOOD, URINE: POSITIVE
BUN BLDV-MCNC: 20 MG/DL
CALCIUM SERPL-MCNC: 9.1 MG/DL
CHLORIDE BLD-SCNC: 102 MMOL/L
CLARITY: CLEAR
CO2: 26 MMOL/L
COLOR: YELLOW
CREAT SERPL-MCNC: 0.9 MG/DL
EGFR: >=90
EOSINOPHILS ABSOLUTE: NORMAL
EOSINOPHILS RELATIVE PERCENT: NORMAL
GLUCOSE BLD-MCNC: 97 MG/DL
GLUCOSE URINE: NEGATIVE
HCT VFR BLD CALC: 42.8 % (ref 41–53)
HEMOGLOBIN: 14.1 G/DL (ref 13.5–17.5)
KETONES, URINE: NEGATIVE
LEUKOCYTE ESTERASE, URINE: NEGATIVE
LYMPHOCYTES ABSOLUTE: NORMAL
LYMPHOCYTES RELATIVE PERCENT: NORMAL
MCH RBC QN AUTO: NORMAL PG
MCHC RBC AUTO-ENTMCNC: NORMAL G/DL
MCV RBC AUTO: NORMAL FL
MONOCYTES ABSOLUTE: NORMAL
MONOCYTES RELATIVE PERCENT: NORMAL
NEUTROPHILS ABSOLUTE: NORMAL
NEUTROPHILS RELATIVE PERCENT: NORMAL
NITRITE, URINE: NEGATIVE
PH UA: 5.5 (ref 4.5–8)
PLATELET # BLD: 210 K/ΜL
PMV BLD AUTO: NORMAL FL
POTASSIUM SERPL-SCNC: 3.8 MMOL/L
PROTEIN UA: POSITIVE
RBC # BLD: 4.94 10^6/ΜL
SODIUM BLD-SCNC: 140 MMOL/L
SPECIFIC GRAVITY, URINE: 1.03
UROBILINOGEN, URINE: NORMAL
WBC # BLD: 12.9 10^3/ML

## 2023-08-08 ENCOUNTER — TELEPHONE (OUTPATIENT)
Dept: NEPHROLOGY | Age: 28
End: 2023-08-08

## 2023-08-08 DIAGNOSIS — N02.8 IGA NEPHROPATHY: Primary | ICD-10-CM

## 2023-08-08 NOTE — TELEPHONE ENCOUNTER
----- Message from Calvin Chung MD sent at 8/8/2023  9:44 AM EDT -----  Labs noted   Check BMP in 2 weeks.

## 2023-08-16 LAB
BASOPHILS ABSOLUTE: NORMAL
BASOPHILS RELATIVE PERCENT: NORMAL
BILIRUBIN, URINE: NEGATIVE
BLOOD, URINE: NORMAL
BUN BLDV-MCNC: 21 MG/DL
CALCIUM SERPL-MCNC: 9.1 MG/DL
CHLORIDE BLD-SCNC: 102 MMOL/L
CLARITY: CLEAR
CO2: 24 MMOL/L
COLOR: YELLOW
CREAT SERPL-MCNC: 0.9 MG/DL
EGFR: 90
EOSINOPHILS ABSOLUTE: NORMAL
EOSINOPHILS RELATIVE PERCENT: NORMAL
GLUCOSE BLD-MCNC: 115 MG/DL
GLUCOSE URINE: NORMAL
HCT VFR BLD CALC: 41.8 % (ref 41–53)
HEMOGLOBIN: 13.7 G/DL (ref 13.5–17.5)
KETONES, URINE: NEGATIVE
LEUKOCYTE ESTERASE, URINE: NEGATIVE
LYMPHOCYTES ABSOLUTE: NORMAL
LYMPHOCYTES RELATIVE PERCENT: NORMAL
MCH RBC QN AUTO: NORMAL PG
MCHC RBC AUTO-ENTMCNC: NORMAL G/DL
MCV RBC AUTO: NORMAL FL
MONOCYTES ABSOLUTE: NORMAL
MONOCYTES RELATIVE PERCENT: NORMAL
NEUTROPHILS ABSOLUTE: NORMAL
NEUTROPHILS RELATIVE PERCENT: NORMAL
NITRITE, URINE: NEGATIVE
PH UA: 6 (ref 4.5–8)
PLATELET # BLD: 213 K/ΜL
PMV BLD AUTO: NORMAL FL
POTASSIUM SERPL-SCNC: 3.8 MMOL/L
PROTEIN UA: NORMAL
RBC # BLD: 4.8 10^6/ΜL
SODIUM BLD-SCNC: 139 MMOL/L
SPECIFIC GRAVITY, URINE: 1.03
UROBILINOGEN, URINE: NORMAL
WBC # BLD: 10.3 10^3/ML

## 2023-08-24 RX ORDER — PREDNISONE 20 MG/1
40 TABLET ORAL DAILY
Qty: 60 TABLET | Refills: 3 | Status: SHIPPED | OUTPATIENT
Start: 2023-08-24

## 2023-08-30 LAB
BASOPHILS ABSOLUTE: 0.2 /ΜL
BASOPHILS RELATIVE PERCENT: 0 %
BILIRUBIN, URINE: NEGATIVE
BLOOD, URINE: POSITIVE
BUN BLDV-MCNC: 22 MG/DL
CALCIUM SERPL-MCNC: 9.6 MG/DL
CHLORIDE BLD-SCNC: 99 MMOL/L
CLARITY: CLEAR
CO2: 26 MMOL/L
COLOR: YELLOW
CREAT SERPL-MCNC: 1 MG/DL
EGFR: >=90
EOSINOPHILS ABSOLUTE: 0.8 /ΜL
EOSINOPHILS RELATIVE PERCENT: 0.1 %
GLUCOSE BLD-MCNC: 99 MG/DL
GLUCOSE URINE: >=1000
HCT VFR BLD CALC: 44.8 % (ref 41–53)
HEMOGLOBIN: 14.6 G/DL (ref 13.5–17.5)
KETONES, URINE: NEGATIVE
LEUKOCYTE ESTERASE, URINE: NEGATIVE
LYMPHOCYTES ABSOLUTE: 35 /ΜL
LYMPHOCYTES RELATIVE PERCENT: 4.8 %
MCH RBC QN AUTO: 28.5 PG
MCHC RBC AUTO-ENTMCNC: 32.6 G/DL
MCV RBC AUTO: 87.3 FL
MONOCYTES ABSOLUTE: 5.8 /ΜL
MONOCYTES RELATIVE PERCENT: 0.8 %
NEUTROPHILS ABSOLUTE: 56.5 /ΜL
NEUTROPHILS RELATIVE PERCENT: 7.7 %
NITRITE, URINE: NEGATIVE
PH UA: 6 (ref 4.5–8)
PLATELET # BLD: 210 K/ΜL
PMV BLD AUTO: 8.6 FL
POTASSIUM SERPL-SCNC: 4 MMOL/L
PROTEIN UA: ABNORMAL
RBC # BLD: 5.13 10^6/ΜL
SODIUM BLD-SCNC: 140 MMOL/L
SPECIFIC GRAVITY, URINE: >=1.03
UROBILINOGEN, URINE: ABNORMAL
WBC # BLD: 13.7 10^3/ML

## 2023-09-08 ENCOUNTER — OFFICE VISIT (OUTPATIENT)
Dept: NEPHROLOGY | Age: 28
End: 2023-09-08

## 2023-09-08 VITALS
SYSTOLIC BLOOD PRESSURE: 141 MMHG | HEART RATE: 91 BPM | OXYGEN SATURATION: 99 % | DIASTOLIC BLOOD PRESSURE: 94 MMHG | BODY MASS INDEX: 37.45 KG/M2 | WEIGHT: 232 LBS

## 2023-09-08 DIAGNOSIS — I12.9 HYPERTENSIVE RENAL DISEASE, STAGE 1 THROUGH STAGE 4 OR UNSPECIFIED CHRONIC KIDNEY DISEASE: ICD-10-CM

## 2023-09-08 DIAGNOSIS — N02.8 IGA NEPHROPATHY: Primary | ICD-10-CM

## 2023-09-08 RX ORDER — ERGOCALCIFEROL 1.25 MG/1
50000 CAPSULE ORAL WEEKLY
Qty: 12 CAPSULE | Refills: 0 | Status: SHIPPED | OUTPATIENT
Start: 2023-09-08

## 2023-09-08 RX ORDER — PREDNISONE 10 MG/1
30 TABLET ORAL DAILY
Qty: 90 TABLET | Refills: 1 | Status: SHIPPED | OUTPATIENT
Start: 2023-09-08

## 2023-10-04 RX ORDER — DAPAGLIFLOZIN 10 MG/1
10 TABLET, FILM COATED ORAL EVERY MORNING
Qty: 30 TABLET | Refills: 3 | Status: SHIPPED | OUTPATIENT
Start: 2023-10-04

## 2023-11-08 DIAGNOSIS — R31.29 MICROHEMATURIA: ICD-10-CM

## 2023-11-08 DIAGNOSIS — E55.9 VITAMIN D DEFICIENCY: ICD-10-CM

## 2023-11-08 DIAGNOSIS — I12.9 HYPERTENSIVE RENAL DISEASE, STAGE 1 THROUGH STAGE 4 OR UNSPECIFIED CHRONIC KIDNEY DISEASE: Primary | ICD-10-CM

## 2023-11-08 DIAGNOSIS — R80.9 PROTEINURIA, UNSPECIFIED TYPE: ICD-10-CM

## 2023-11-08 DIAGNOSIS — I10 PRIMARY HYPERTENSION: ICD-10-CM

## 2023-11-08 DIAGNOSIS — N02.B9 IGA NEPHROPATHY: ICD-10-CM

## 2023-11-08 DIAGNOSIS — I10 ESSENTIAL HYPERTENSION: ICD-10-CM

## 2023-11-08 DIAGNOSIS — N02.8 IGA NEPHROPATHY DETERMINED BY BIOPSY OF KIDNEY: ICD-10-CM

## 2023-11-08 DIAGNOSIS — R31.9 HEMATURIA, UNSPECIFIED TYPE: ICD-10-CM

## 2023-11-08 DIAGNOSIS — R79.89 ELEVATED SERUM CREATININE: ICD-10-CM

## 2023-11-08 LAB
ALBUMIN SERPL-MCNC: 4.7 G/DL
ALP BLD-CCNC: 55 U/L
ALT SERPL-CCNC: 65 U/L
ANION GAP SERPL CALCULATED.3IONS-SCNC: 12 MMOL/L
AST SERPL-CCNC: 22 U/L
BASOPHILS ABSOLUTE: 0.1 /ΜL
BASOPHILS RELATIVE PERCENT: 0 %
BILIRUB SERPL-MCNC: 0.3 MG/DL (ref 0.1–1.4)
BILIRUBIN, URINE: NEGATIVE
BLOOD, URINE: POSITIVE
BUN BLDV-MCNC: 16 MG/DL
CALCIUM SERPL-MCNC: 9.3 MG/DL
CHLORIDE BLD-SCNC: 99 MMOL/L
CLARITY: CLEAR
CO2: 26 MMOL/L
COLOR: YELLOW
CREAT SERPL-MCNC: 0.8 MG/DL
EGFR: >=90
EOSINOPHILS ABSOLUTE: 0.2 /ΜL
EOSINOPHILS RELATIVE PERCENT: 0 %
GLUCOSE BLD-MCNC: 103 MG/DL
GLUCOSE URINE: >=1000
HCT VFR BLD CALC: 46.4 % (ref 41–53)
HEMOGLOBIN: 14.7 G/DL (ref 13.5–17.5)
KETONES, URINE: NEGATIVE
LEUKOCYTE ESTERASE, URINE: NEGATIVE
LYMPHOCYTES ABSOLUTE: 12.8 /ΜL
LYMPHOCYTES RELATIVE PERCENT: 1.3 %
MCH RBC QN AUTO: 28.2 PG
MCHC RBC AUTO-ENTMCNC: 31.7 G/DL
MCV RBC AUTO: 89.1 FL
MONOCYTES ABSOLUTE: 2.9 /ΜL
MONOCYTES RELATIVE PERCENT: 0.3 %
NEUTROPHILS ABSOLUTE: 82.7 /ΜL
NEUTROPHILS RELATIVE PERCENT: 8.2 %
NITRITE, URINE: NEGATIVE
PH UA: 6 (ref 4.5–8)
PLATELET # BLD: 216 K/ΜL
PMV BLD AUTO: 9 FL
POTASSIUM SERPL-SCNC: 3.9 MMOL/L
PROTEIN UA: NEGATIVE
RBC # BLD: 5.21 10^6/ΜL
SODIUM BLD-SCNC: 137 MMOL/L
SPECIFIC GRAVITY, URINE: 1.02
TOTAL PROTEIN: 6.8
UROBILINOGEN, URINE: NORMAL
WBC # BLD: 9.9 10^3/ML

## 2023-11-10 ENCOUNTER — OFFICE VISIT (OUTPATIENT)
Dept: NEPHROLOGY | Age: 28
End: 2023-11-10
Payer: COMMERCIAL

## 2023-11-10 VITALS
SYSTOLIC BLOOD PRESSURE: 128 MMHG | HEART RATE: 136 BPM | WEIGHT: 233 LBS | BODY MASS INDEX: 37.61 KG/M2 | OXYGEN SATURATION: 98 % | DIASTOLIC BLOOD PRESSURE: 79 MMHG

## 2023-11-10 DIAGNOSIS — N02.8 IGA NEPHROPATHY DETERMINED BY BIOPSY OF KIDNEY: Primary | ICD-10-CM

## 2023-11-10 DIAGNOSIS — I10 PRIMARY HYPERTENSION: ICD-10-CM

## 2023-11-10 PROCEDURE — 3074F SYST BP LT 130 MM HG: CPT | Performed by: INTERNAL MEDICINE

## 2023-11-10 PROCEDURE — 99213 OFFICE O/P EST LOW 20 MIN: CPT | Performed by: INTERNAL MEDICINE

## 2023-11-10 PROCEDURE — 3078F DIAST BP <80 MM HG: CPT | Performed by: INTERNAL MEDICINE

## 2023-11-10 RX ORDER — PREDNISONE 10 MG/1
20 TABLET ORAL DAILY
Qty: 180 TABLET | Refills: 1 | Status: SHIPPED | OUTPATIENT
Start: 2023-11-10

## 2023-11-10 NOTE — PROGRESS NOTES
Script pending
microscopic hematuria. Biopsy showed IgA nephropathy with crescents (not RPGN). However only 5 out 52 glomeruli had crescents (~ 9.6%). Creatinine is stable. MEST M0 E1 S1 T0 C1. Discussed in detail with patient. Crescents <25% glomeruli. UPCR was 2.2 grams. Proteinuria continues to improve. Dipstick is negative as of 11/8/23. Continue with clonidine 0.2 mg TID and coreg 25 mg BID for better BP control. Continue with losartan 100 mg daily. Reduce prednisone 20 mg daily. Continue with Sunshine. Will repeat labs in 6 week and if proteinuria improves further then will reduce prednisone to 10 mg daily. Again, I reviewed with patient that there is no clear evidence that crescents in the absence of change in eGFR means much specially when <25%. Also reviewed that KDIGO does not support using crescents (presence or absence) in deciding treatment. Regardless, I think patient needs to be followed very closely for change in eGFR. I am reluctant to put him on cytoxan given his young age and fertility issues to cytoxan. Could consider cellcept in the future or rituximab. For now follow with prednisone. He is responding. Continue with losartan and bactrim. Reduce prednisone 20 mg daily. 2. HTN: on clonidine/losartan/coreg. 3. Positive NHUNG: Saw rheumatology, appreciate input  4. Dyslipidemia: , advised weight loss, lifestyle modification  5. Vit D : on replacement. 6. Skin rash: ?HSP. Was referred to dermatology by rheumatology. He has not made appt yet. Orders Placed This Encounter   Procedures    Basic Metabolic Panel    CBC    Protein / creatinine ratio, urine    Urinalysis     Return in about 2 months (around 1/10/2024).       Bryanna Burns MD  [facility]

## 2023-11-13 RX ORDER — ERGOCALCIFEROL 1.25 MG/1
CAPSULE ORAL
Qty: 12 CAPSULE | Refills: 0 | Status: SHIPPED | OUTPATIENT
Start: 2023-11-13

## 2023-12-29 LAB
BASOPHILS ABSOLUTE: NORMAL
BASOPHILS RELATIVE PERCENT: NORMAL
BILIRUBIN, URINE: NEGATIVE
BLOOD, URINE: POSITIVE
BUN BLDV-MCNC: 21 MG/DL
CALCIUM SERPL-MCNC: 9.7 MG/DL
CHLORIDE BLD-SCNC: 100 MMOL/L
CLARITY: CLEAR
CO2: 26 MMOL/L
COLOR: YELLOW
CREAT SERPL-MCNC: 0.9 MG/DL
EGFR: <90
EOSINOPHILS ABSOLUTE: NORMAL
EOSINOPHILS RELATIVE PERCENT: NORMAL
GLUCOSE BLD-MCNC: 107 MG/DL
GLUCOSE URINE: 500
HCT VFR BLD CALC: 47.3 % (ref 41–53)
HEMOGLOBIN: 15.4 G/DL (ref 13.5–17.5)
KETONES, URINE: NEGATIVE
LEUKOCYTE ESTERASE, URINE: NEGATIVE
LYMPHOCYTES ABSOLUTE: NORMAL
LYMPHOCYTES RELATIVE PERCENT: NORMAL
MCH RBC QN AUTO: NORMAL PG
MCHC RBC AUTO-ENTMCNC: NORMAL G/DL
MCV RBC AUTO: NORMAL FL
MONOCYTES ABSOLUTE: NORMAL
MONOCYTES RELATIVE PERCENT: NORMAL
NEUTROPHILS ABSOLUTE: NORMAL
NEUTROPHILS RELATIVE PERCENT: NORMAL
NITRITE, URINE: NEGATIVE
PH UA: 6.5 (ref 4.5–8)
PLATELET # BLD: 231 K/ΜL
PMV BLD AUTO: NORMAL FL
POTASSIUM SERPL-SCNC: 4 MMOL/L
PROTEIN UA: POSITIVE
RBC # BLD: 5.45 10^6/ΜL
SODIUM BLD-SCNC: 138 MMOL/L
SPECIFIC GRAVITY, URINE: 1.02
UROBILINOGEN, URINE: ABNORMAL
WBC # BLD: 11.9 10^3/ML

## 2024-01-12 ENCOUNTER — OFFICE VISIT (OUTPATIENT)
Dept: NEPHROLOGY | Age: 29
End: 2024-01-12
Payer: COMMERCIAL

## 2024-01-12 VITALS — BODY MASS INDEX: 37.45 KG/M2 | HEIGHT: 66 IN | WEIGHT: 233 LBS

## 2024-01-12 DIAGNOSIS — E55.9 VITAMIN D DEFICIENCY: ICD-10-CM

## 2024-01-12 DIAGNOSIS — I10 PRIMARY HYPERTENSION: ICD-10-CM

## 2024-01-12 DIAGNOSIS — N02.8 IGA NEPHROPATHY DETERMINED BY BIOPSY OF KIDNEY: Primary | ICD-10-CM

## 2024-01-12 PROCEDURE — 99213 OFFICE O/P EST LOW 20 MIN: CPT | Performed by: INTERNAL MEDICINE

## 2024-01-12 RX ORDER — TRIAMCINOLONE ACETONIDE 1 MG/G
CREAM TOPICAL
COMMUNITY
Start: 2023-09-20

## 2024-01-12 RX ORDER — PREDNISONE 10 MG/1
10 TABLET ORAL DAILY
Qty: 180 TABLET | Refills: 1
Start: 2024-01-12

## 2024-01-12 NOTE — PROGRESS NOTES
[facility] [place] [facility]  [facility] [place] [facility]  [address]  [facility] [address]  Dept: [phone]  Loc: [phone]  Office Progress Note  1/12/2024 9:30 AM      Pt Name:    Hero Blanchard  YOB: 1995  Primary Care Physician:  Hong Melo MD     Chief Complaint:   Chief Complaint   Patient presents with    Follow-up     For IgA nephropathy        Background Information/Interval History:   The patient is a 28 y.o. white male patient with proteinuria and hematuria. Patient reports history of hypertension for few years. He says atleast 2 years. He says mother has high BP.      Here for follow-up.  Feels well overall. No chest pain or shortness of breath. Feels better infact. He says his energy level is better. No joint pains. Seeing PCP and had skin biopsy- I dont have the results. I've asked him to get us the biopsy report. Also seeing dermatology.      Past History:  Past Medical History:   Diagnosis Date    Diabetes mellitus (HCC)     Hypertension      Past Surgical History:   Procedure Laterality Date    CT BIOPSY RENAL  5/23/2023    CT BIOPSY RENAL 5/23/2023 [facility] CT SCAN    TONGUE SURGERY          VITALS:  Ht 1.676 m (5' 6\")   Wt 105.7 kg (233 lb)   BMI 37.61 kg/m²   Vitals:    01/12/24 0904   Weight: 105.7 kg (233 lb)   Height: 1.676 m (5' 6\")     /86, HR 72  Wt Readings from Last 3 Encounters:   01/12/24 105.7 kg (233 lb)   11/10/23 105.7 kg (233 lb)   09/08/23 105.2 kg (232 lb)     Body mass index is 37.61 kg/m².     General Appearance: alert and cooperative with exam, appears comfortable, no distress  Oral: moist oral mucus membranes  Neck: No jugular venous distention  Lungs: Air entry B/L, no crackles or rales, no use of accessory muscles  Heart: S1, S2 heard  GI: soft, non-tender, no guarding  Extremities: No sig LE edema     Medications:  Current Outpatient Medications   Medication Sig Dispense Refill    triamcinolone (KENALOG)

## 2024-03-05 RX ORDER — ERGOCALCIFEROL 1.25 MG/1
CAPSULE ORAL
Qty: 12 CAPSULE | Refills: 0 | Status: SHIPPED | OUTPATIENT
Start: 2024-03-05

## 2024-03-19 LAB
ALBUMIN SERPL-MCNC: 4.6 G/DL
ALP BLD-CCNC: 81 U/L
ALT SERPL-CCNC: 36 U/L
ANION GAP SERPL CALCULATED.3IONS-SCNC: 12 MMOL/L
AST SERPL-CCNC: 20 U/L
BASOPHILS ABSOLUTE: NORMAL
BASOPHILS RELATIVE PERCENT: NORMAL
BILIRUB SERPL-MCNC: 0.3 MG/DL (ref 0.1–1.4)
BILIRUBIN, URINE: NEGATIVE
BLOOD, URINE: POSITIVE
BUN BLDV-MCNC: 14 MG/DL
CALCIUM SERPL-MCNC: 9.9 MG/DL
CHLORIDE BLD-SCNC: 103 MMOL/L
CLARITY: CLEAR
CO2: 27 MMOL/L
COLOR: YELLOW
CREAT SERPL-MCNC: 0.9 MG/DL
EGFR: >=90
EOSINOPHILS ABSOLUTE: NORMAL
EOSINOPHILS RELATIVE PERCENT: NORMAL
GLUCOSE BLD-MCNC: 76 MG/DL
GLUCOSE URINE: 1000
HCT VFR BLD CALC: 47.5 % (ref 41–53)
HEMOGLOBIN: 15.5 G/DL (ref 13.5–17.5)
KETONES, URINE: NEGATIVE
LEUKOCYTE ESTERASE, URINE: NEGATIVE
LYMPHOCYTES ABSOLUTE: NORMAL
LYMPHOCYTES RELATIVE PERCENT: NORMAL
MCH RBC QN AUTO: NORMAL PG
MCHC RBC AUTO-ENTMCNC: NORMAL G/DL
MCV RBC AUTO: NORMAL FL
MONOCYTES ABSOLUTE: NORMAL
MONOCYTES RELATIVE PERCENT: NORMAL
NEUTROPHILS ABSOLUTE: NORMAL
NEUTROPHILS RELATIVE PERCENT: NORMAL
NITRITE, URINE: NEGATIVE
PH UA: 6 (ref 4.5–8)
PLATELET # BLD: 254 K/ΜL
PMV BLD AUTO: NORMAL FL
POTASSIUM SERPL-SCNC: 4 MMOL/L
PROTEIN UA: POSITIVE
RBC # BLD: 5.57 10^6/ΜL
SODIUM BLD-SCNC: 142 MMOL/L
SPECIFIC GRAVITY, URINE: 1.03
TOTAL PROTEIN: 7
UROBILINOGEN, URINE: ABNORMAL
VITAMIN D 25-HYDROXY: 33
VITAMIN D2, 25 HYDROXY: NORMAL
VITAMIN D3,25 HYDROXY: NORMAL
WBC # BLD: 8.4 10^3/ML

## 2024-03-20 ENCOUNTER — OFFICE VISIT (OUTPATIENT)
Dept: NEPHROLOGY | Age: 29
End: 2024-03-20
Payer: COMMERCIAL

## 2024-03-20 VITALS
OXYGEN SATURATION: 98 % | SYSTOLIC BLOOD PRESSURE: 132 MMHG | DIASTOLIC BLOOD PRESSURE: 89 MMHG | WEIGHT: 232 LBS | HEIGHT: 66 IN | BODY MASS INDEX: 37.28 KG/M2

## 2024-03-20 DIAGNOSIS — N02.B9 IGA NEPHROPATHY: Primary | ICD-10-CM

## 2024-03-20 DIAGNOSIS — I10 PRIMARY HYPERTENSION: ICD-10-CM

## 2024-03-20 PROCEDURE — 3075F SYST BP GE 130 - 139MM HG: CPT | Performed by: INTERNAL MEDICINE

## 2024-03-20 PROCEDURE — 3079F DIAST BP 80-89 MM HG: CPT | Performed by: INTERNAL MEDICINE

## 2024-03-20 PROCEDURE — 99213 OFFICE O/P EST LOW 20 MIN: CPT | Performed by: INTERNAL MEDICINE

## 2024-03-20 RX ORDER — PREDNISONE 1 MG/1
4 TABLET ORAL DAILY
Qty: 120 TABLET | Refills: 3 | Status: SHIPPED | OUTPATIENT
Start: 2024-03-20

## 2024-03-20 NOTE — PROGRESS NOTES
[facility] [place] [facility]  [facility]  [address]  [facility] [address]  Dept: [phone]  Loc: [phone]  Office Progress Note  3/20/2024 3:45 PM      Pt Name:    Hero Blanchard  YOB: 1995  Primary Care Physician:  Hong Melo MD     Chief Complaint:   Chief Complaint   Patient presents with    Follow-up     For IgA nephropathy        Background Information/Interval History:   The patient is a 29 y.o. white male patient with proteinuria and hematuria. Patient reports history of hypertension for few years. He says atleast 2 years. He says mother has high BP.      Jan 2024: Here for follow-up.  Feels well overall. No chest pain or shortness of breath. Feels better infact. He says his energy level is better. No joint pains. Seeing PCP and had skin biopsy- I dont have the results. I've asked him to get us the biopsy report. Also seeing dermatology.     March 2024: He still has not dropped of the biopsy report. He says he has it at home. Prednisone was started in July 2023. He was started on 60 mg daily and now has been tapered down to 5 mg daily.      Past History:  Past Medical History:   Diagnosis Date    Diabetes mellitus (HCC)     Hypertension      Past Surgical History:   Procedure Laterality Date    CT BIOPSY RENAL  5/23/2023    CT BIOPSY RENAL 5/23/2023 [facility] CT SCAN    TONGUE SURGERY          VITALS:  /89 (Site: Left Upper Arm, Position: Sitting, Cuff Size: Large Adult)   Ht 1.676 m (5' 6\")   Wt 105.2 kg (232 lb)   SpO2 98%   BMI 37.45 kg/m²   Vitals:    03/20/24 1535   BP: 132/89   Site: Left Upper Arm   Position: Sitting   Cuff Size: Large Adult   SpO2: 98%   Weight: 105.2 kg (232 lb)   Height: 1.676 m (5' 6\")          General Appearance: alert and cooperative with exam, appears comfortable, no distress  Lungs: Air entry B/L, no crackles or rales, no use of accessory muscles  Heart: S1, S2 heard  GI: soft, non-tender, no

## 2024-05-28 LAB
ALBUMIN SERPL-MCNC: 4.6 G/DL
ALP BLD-CCNC: 83 U/L
ALT SERPL-CCNC: 42 U/L
ANION GAP SERPL CALCULATED.3IONS-SCNC: 12 MMOL/L
AST SERPL-CCNC: 24 U/L
BASOPHILS ABSOLUTE: NORMAL
BASOPHILS RELATIVE PERCENT: NORMAL
BILIRUB SERPL-MCNC: 0.4 MG/DL (ref 0.1–1.4)
BILIRUBIN, URINE: NEGATIVE
BLOOD, URINE: POSITIVE
BUN BLDV-MCNC: 19 MG/DL
CALCIUM SERPL-MCNC: 9.6 MG/DL
CHLORIDE BLD-SCNC: 101 MMOL/L
CLARITY: CLEAR
CO2: 25 MMOL/L
COLOR: YELLOW
CREAT SERPL-MCNC: 1.1 MG/DL
EGFR: 89
EOSINOPHILS ABSOLUTE: NORMAL
EOSINOPHILS RELATIVE PERCENT: NORMAL
GLUCOSE BLD-MCNC: 90 MG/DL
GLUCOSE URINE: <1000
HCT VFR BLD CALC: 44.7 % (ref 41–53)
HEMOGLOBIN: 14.9 G/DL (ref 13.5–17.5)
KETONES, URINE: NEGATIVE
LEUKOCYTE ESTERASE, URINE: NEGATIVE
LYMPHOCYTES ABSOLUTE: NORMAL
LYMPHOCYTES RELATIVE PERCENT: NORMAL
MCH RBC QN AUTO: NORMAL PG
MCHC RBC AUTO-ENTMCNC: NORMAL G/DL
MCV RBC AUTO: NORMAL FL
MONOCYTES ABSOLUTE: NORMAL
MONOCYTES RELATIVE PERCENT: NORMAL
NEUTROPHILS ABSOLUTE: NORMAL
NEUTROPHILS RELATIVE PERCENT: NORMAL
NITRITE, URINE: NEGATIVE
PH UA: 6 (ref 4.5–8)
PLATELET # BLD: 243 K/ΜL
PMV BLD AUTO: NORMAL FL
POTASSIUM SERPL-SCNC: 3.9 MMOL/L
PROTEIN UA: POSITIVE
RBC # BLD: 5.46 10^6/ΜL
SODIUM BLD-SCNC: 138 MMOL/L
SPECIFIC GRAVITY, URINE: <1.03
TOTAL PROTEIN: 7.1
UROBILINOGEN, URINE: ABNORMAL
VITAMIN D 25-HYDROXY: 35
VITAMIN D2, 25 HYDROXY: NORMAL
VITAMIN D3,25 HYDROXY: NORMAL
WBC # BLD: 8.3 10^3/ML

## 2024-05-29 RX ORDER — ERGOCALCIFEROL 1.25 MG/1
CAPSULE ORAL
Qty: 12 CAPSULE | Refills: 3 | OUTPATIENT
Start: 2024-05-29

## 2024-07-02 RX ORDER — DAPAGLIFLOZIN 10 MG/1
10 TABLET, FILM COATED ORAL EVERY MORNING
Qty: 30 TABLET | Refills: 3 | Status: SHIPPED | OUTPATIENT
Start: 2024-07-02

## 2024-07-10 ENCOUNTER — OFFICE VISIT (OUTPATIENT)
Dept: NEPHROLOGY | Age: 29
End: 2024-07-10
Payer: COMMERCIAL

## 2024-07-10 VITALS
OXYGEN SATURATION: 98 % | DIASTOLIC BLOOD PRESSURE: 100 MMHG | SYSTOLIC BLOOD PRESSURE: 142 MMHG | BODY MASS INDEX: 36.8 KG/M2 | HEIGHT: 66 IN | HEART RATE: 89 BPM | WEIGHT: 229 LBS

## 2024-07-10 DIAGNOSIS — I10 PRIMARY HYPERTENSION: ICD-10-CM

## 2024-07-10 DIAGNOSIS — N02.B9 IGA NEPHROPATHY: Primary | ICD-10-CM

## 2024-07-10 PROCEDURE — 3077F SYST BP >= 140 MM HG: CPT | Performed by: INTERNAL MEDICINE

## 2024-07-10 PROCEDURE — 3080F DIAST BP >= 90 MM HG: CPT | Performed by: INTERNAL MEDICINE

## 2024-07-10 PROCEDURE — 99213 OFFICE O/P EST LOW 20 MIN: CPT | Performed by: INTERNAL MEDICINE

## 2024-07-10 RX ORDER — CARVEDILOL 25 MG/1
25 TABLET ORAL 2 TIMES DAILY
Qty: 180 TABLET | Refills: 3 | Status: SHIPPED | OUTPATIENT
Start: 2024-07-10

## 2024-07-10 RX ORDER — DAPAGLIFLOZIN 10 MG/1
10 TABLET, FILM COATED ORAL EVERY MORNING
Qty: 30 TABLET | Refills: 5 | Status: SHIPPED | OUTPATIENT
Start: 2024-07-10

## 2024-07-10 RX ORDER — FAMOTIDINE 20 MG/1
20 TABLET, FILM COATED ORAL DAILY
Qty: 90 TABLET | Refills: 3 | Status: SHIPPED | OUTPATIENT
Start: 2024-07-10

## 2024-07-10 RX ORDER — PREDNISONE 1 MG/1
4 TABLET ORAL DAILY
Qty: 120 TABLET | Refills: 3 | Status: SHIPPED | OUTPATIENT
Start: 2024-07-10

## 2024-07-10 RX ORDER — CLONIDINE HYDROCHLORIDE 0.2 MG/1
0.2 TABLET ORAL 3 TIMES DAILY
Qty: 270 TABLET | Refills: 3 | Status: SHIPPED | OUTPATIENT
Start: 2024-07-10

## 2024-07-10 RX ORDER — SULFAMETHOXAZOLE AND TRIMETHOPRIM 800; 160 MG/1; MG/1
TABLET ORAL
Qty: 15 TABLET | Refills: 5 | Status: SHIPPED | OUTPATIENT
Start: 2024-07-10

## 2024-07-10 NOTE — PROGRESS NOTES
[facility] [place] [facility]  [facility]  [address]  [facility] [address]  Dept: [phone]  Loc: [phone]  Office Progress Note  7/10/2024 10:38 AM      Pt Name:    Hero Blanchard  YOB: 1995  Primary Care Physician:  Hong Melo MD     Chief Complaint:   Chief Complaint   Patient presents with    Follow-up     For IgA nephropathy        Background Information/Interval History:   The patient is a 29 y.o. white male patient with proteinuria and hematuria. Patient reports history of hypertension for few years. He says atleast 2 years. He says mother has high BP.      Jan 2024: Here for follow-up.  Feels well overall. No chest pain or shortness of breath. Feels better infact. He says his energy level is better. No joint pains. Seeing PCP and had skin biopsy- I dont have the results. I've asked him to get us the biopsy report. Also seeing dermatology.     March 2024: He still has not dropped of the biopsy report. He says he has it at home. Prednisone was started in July 2023. He was started on 60 mg daily and now has been tapered down to 5 mg daily.     July 2024: Feels well. No active or new rashes. No leg swelling. Ran out of Bp medications but did not call us. He says \"I procrastinated.\" Also add he is planning to lose weight. He says he ran out of prednisone, Farxiga, Clonidine and coreg. He ran out of losartan.     Past History:  Past Medical History:   Diagnosis Date    Diabetes mellitus (HCC)     Hypertension      Past Surgical History:   Procedure Laterality Date    CT BIOPSY RENAL  5/23/2023    CT BIOPSY RENAL 5/23/2023 [facility] CT SCAN    TONGUE SURGERY          VITALS:  BP (!) 142/100 (Site: Left Upper Arm, Position: Sitting, Cuff Size: Large Adult)   Pulse 89   Ht 1.676 m (5' 6\")   Wt 103.9 kg (229 lb)   SpO2 98%   BMI 36.96 kg/m²   Vitals:    07/10/24 1026   BP: (!) 142/100   Site: Left Upper Arm   Position: Sitting   Cuff Size:

## 2024-07-10 NOTE — PROGRESS NOTES
Pt ran out of his clonidine, pepcid, losartan and prednisone for about a month.    Last labs 5/28/24

## 2024-09-06 LAB
BASOPHILS ABSOLUTE: NORMAL
BASOPHILS RELATIVE PERCENT: NORMAL
BILIRUBIN, URINE: NEGATIVE
BLOOD, URINE: POSITIVE
BUN BLDV-MCNC: 15 MG/DL
CALCIUM SERPL-MCNC: 9.5 MG/DL
CHLORIDE BLD-SCNC: 103 MMOL/L
CLARITY, UA: ABNORMAL
CO2: 25 MMOL/L
COLOR, UA: YELLOW
CREAT SERPL-MCNC: 1.1 MG/DL
EGFR: 89
EOSINOPHILS ABSOLUTE: NORMAL
EOSINOPHILS RELATIVE PERCENT: NORMAL
GLUCOSE BLD-MCNC: 122 MG/DL
GLUCOSE URINE: >1000
HCT VFR BLD CALC: 49.9 % (ref 41–53)
HEMOGLOBIN: 15.9 G/DL (ref 13.5–17.5)
KETONES, URINE: NEGATIVE
LEUKOCYTE ESTERASE, URINE: NEGATIVE
LYMPHOCYTES ABSOLUTE: NORMAL
LYMPHOCYTES RELATIVE PERCENT: NORMAL
MCH RBC QN AUTO: NORMAL PG
MCHC RBC AUTO-ENTMCNC: NORMAL G/DL
MCV RBC AUTO: NORMAL FL
MONOCYTES ABSOLUTE: NORMAL
MONOCYTES RELATIVE PERCENT: NORMAL
NEUTROPHILS ABSOLUTE: NORMAL
NEUTROPHILS RELATIVE PERCENT: NORMAL
NITRITE, URINE: NEGATIVE
PH UA: 6 (ref 4.5–8)
PLATELET # BLD: 255 K/ΜL
PMV BLD AUTO: NORMAL FL
POTASSIUM SERPL-SCNC: 4.1 MMOL/L
PROTEIN UA: POSITIVE
RBC # BLD: 5.87 10^6/ΜL
SODIUM BLD-SCNC: 140 MMOL/L
SPECIFIC GRAVITY UA: 1.03 (ref 1–1.03)
UROBILINOGEN, URINE: ABNORMAL
WBC # BLD: 7.1 10^3/ML

## 2024-09-13 ENCOUNTER — OFFICE VISIT (OUTPATIENT)
Dept: NEPHROLOGY | Age: 29
End: 2024-09-13
Payer: COMMERCIAL

## 2024-09-13 VITALS
WEIGHT: 216 LBS | BODY MASS INDEX: 34.72 KG/M2 | OXYGEN SATURATION: 100 % | HEIGHT: 66 IN | SYSTOLIC BLOOD PRESSURE: 133 MMHG | HEART RATE: 70 BPM | DIASTOLIC BLOOD PRESSURE: 87 MMHG

## 2024-09-13 DIAGNOSIS — I10 PRIMARY HYPERTENSION: ICD-10-CM

## 2024-09-13 DIAGNOSIS — N02.B9 IGA NEPHROPATHY: Primary | ICD-10-CM

## 2024-09-13 PROCEDURE — 3075F SYST BP GE 130 - 139MM HG: CPT | Performed by: INTERNAL MEDICINE

## 2024-09-13 PROCEDURE — 3079F DIAST BP 80-89 MM HG: CPT | Performed by: INTERNAL MEDICINE

## 2024-09-13 PROCEDURE — 99214 OFFICE O/P EST MOD 30 MIN: CPT | Performed by: INTERNAL MEDICINE

## 2024-09-13 RX ORDER — DAPAGLIFLOZIN 10 MG/1
10 TABLET, FILM COATED ORAL EVERY MORNING
Qty: 30 TABLET | Refills: 5 | Status: SHIPPED | OUTPATIENT
Start: 2024-09-13

## 2024-09-13 RX ORDER — PREDNISONE 1 MG/1
3 TABLET ORAL DAILY
Qty: 90 TABLET | Refills: 5 | Status: SHIPPED | OUTPATIENT
Start: 2024-09-13

## 2024-11-02 LAB
A/G RATIO: NORMAL
ALBUMIN: 4.5 G/DL
ALP BLD-CCNC: 92 U/L
ALT SERPL-CCNC: 18 U/L
AST SERPL-CCNC: 17 U/L
BASOPHILS ABSOLUTE: 0.4 /ΜL
BASOPHILS RELATIVE PERCENT: 0 %
BILIRUB SERPL-MCNC: 0.7 MG/DL (ref 0.1–1.4)
BILIRUBIN DIRECT: 0.2 MG/DL
BILIRUBIN, INDIRECT: NORMAL
BILIRUBIN, URINE: NEGATIVE
BLOOD, URINE: POSITIVE
BUN BLDV-MCNC: 14 MG/DL
CALCIUM SERPL-MCNC: 9.5 MG/DL
CHLORIDE BLD-SCNC: 103 MMOL/L
CLARITY, UA: CLEAR
CO2: 25 MMOL/L
COLOR, UA: YELLOW
CREAT SERPL-MCNC: 0.9 MG/DL
EGFR: >=90
EOSINOPHILS ABSOLUTE: 4.3 /ΜL
EOSINOPHILS RELATIVE PERCENT: 0.3 %
GLOBULIN: NORMAL
GLUCOSE BLD-MCNC: 93 MG/DL
GLUCOSE URINE: ABNORMAL
HCT VFR BLD CALC: 50.3 % (ref 41–53)
HEMOGLOBIN: 16.3 G/DL (ref 13.5–17.5)
KETONES, URINE: NEGATIVE
LEUKOCYTE ESTERASE, URINE: NEGATIVE
LYMPHOCYTES ABSOLUTE: 29.6 /ΜL
LYMPHOCYTES RELATIVE PERCENT: 2.2 %
MCH RBC QN AUTO: 27.3 PG
MCHC RBC AUTO-ENTMCNC: 32.4 G/DL
MCV RBC AUTO: 84.3 FL
MONOCYTES ABSOLUTE: 6.4 /ΜL
MONOCYTES RELATIVE PERCENT: 0.5 %
NEUTROPHILS ABSOLUTE: 58.9 /ΜL
NEUTROPHILS RELATIVE PERCENT: 4.4 %
NITRITE, URINE: NEGATIVE
PH UA: 6 (ref 4.5–8)
PLATELET # BLD: 237 K/ΜL
PMV BLD AUTO: 8.8 FL
POTASSIUM SERPL-SCNC: 4.6 MMOL/L
PROTEIN UA: POSITIVE
PTH INTACT: 54
RBC # BLD: 5.97 10^6/ΜL
SODIUM BLD-SCNC: 141 MMOL/L
SPECIFIC GRAVITY UA: 1.03 (ref 1–1.03)
TOTAL PROTEIN: 7.4 G/DL (ref 6.4–8.2)
UROBILINOGEN, URINE: ABNORMAL
WBC # BLD: 7.5 10^3/ML

## 2024-11-08 ENCOUNTER — OFFICE VISIT (OUTPATIENT)
Dept: NEPHROLOGY | Age: 29
End: 2024-11-08
Payer: COMMERCIAL

## 2024-11-08 ENCOUNTER — TELEPHONE (OUTPATIENT)
Dept: NEPHROLOGY | Age: 29
End: 2024-11-08

## 2024-11-08 VITALS
WEIGHT: 207 LBS | HEART RATE: 66 BPM | OXYGEN SATURATION: 94 % | BODY MASS INDEX: 34.49 KG/M2 | HEIGHT: 65 IN | SYSTOLIC BLOOD PRESSURE: 118 MMHG | DIASTOLIC BLOOD PRESSURE: 79 MMHG

## 2024-11-08 DIAGNOSIS — E78.5 DYSLIPIDEMIA: ICD-10-CM

## 2024-11-08 DIAGNOSIS — I10 PRIMARY HYPERTENSION: ICD-10-CM

## 2024-11-08 DIAGNOSIS — N02.B9 IGA NEPHROPATHY: Primary | ICD-10-CM

## 2024-11-08 PROCEDURE — 3078F DIAST BP <80 MM HG: CPT | Performed by: INTERNAL MEDICINE

## 2024-11-08 PROCEDURE — 99214 OFFICE O/P EST MOD 30 MIN: CPT | Performed by: INTERNAL MEDICINE

## 2024-11-08 PROCEDURE — 3074F SYST BP LT 130 MM HG: CPT | Performed by: INTERNAL MEDICINE

## 2024-11-08 RX ORDER — FAMOTIDINE 20 MG/1
20 TABLET, FILM COATED ORAL DAILY
Qty: 90 TABLET | Refills: 3 | Status: SHIPPED | OUTPATIENT
Start: 2024-11-08

## 2024-11-08 RX ORDER — DAPAGLIFLOZIN 10 MG/1
10 TABLET, FILM COATED ORAL EVERY MORNING
Qty: 90 TABLET | Refills: 3 | Status: SHIPPED | OUTPATIENT
Start: 2024-11-08

## 2024-11-08 RX ORDER — ATORVASTATIN CALCIUM 20 MG/1
20 TABLET, FILM COATED ORAL DAILY
Qty: 30 TABLET | Refills: 3 | Status: SHIPPED | OUTPATIENT
Start: 2024-11-08

## 2024-11-08 RX ORDER — PREDNISONE 1 MG/1
2 TABLET ORAL DAILY
Qty: 60 TABLET | Refills: 1
Start: 2024-11-08

## 2024-11-08 RX ORDER — SULFAMETHOXAZOLE AND TRIMETHOPRIM 800; 160 MG/1; MG/1
TABLET ORAL
Qty: 45 TABLET | Refills: 1 | Status: SHIPPED | OUTPATIENT
Start: 2024-11-08

## 2024-11-08 NOTE — TELEPHONE ENCOUNTER
Hero wants to know if you still want him to do the 24 hour urine test that he did not get done for his appointment today.

## 2024-11-08 NOTE — PROGRESS NOTES
Scripts pending  
of change in eGFR means much specially when <25% in IgA nephropathy. Also reviewed that KDIGO does not support using crescents (presence or absence) in deciding treatment. Regardless, I think patient needs to be followed very closely for change in eGFR. Could consider cellcept in the future.  For now follow with tapering prednisone. He is responding. Continue with losartan and bactrim. Creat is stable at 0.9      2. HTN: has not been checking BP at home   3. Positive NHUNG: Saw rheumatology, appreciate input  4. Dyslipidemia: advised weight loss, lifestyle modification. Start lipitor low dose. Indications, Risks/Benefits and alternatives were all discussed in detail. Check Liver function tests 1 month  5. Vit D : on replacement.   6. Skin rash: Bx showed focal epidermal papillomatosis.     US March 2023 reviewed.     Orders Placed This Encounter   Procedures    Hepatic Function Panel    Lipid Panel    CBC    Comprehensive Metabolic Panel    Vitamin D 25 Hydroxy    Protein / creatinine ratio, urine    Urinalysis     Return in about 3 months (around 2/8/2025).    Oumar Matt MD  [facility]

## 2024-11-29 RX ORDER — PREDNISONE 10 MG/1
20 TABLET ORAL DAILY
Qty: 180 TABLET | Refills: 1 | OUTPATIENT
Start: 2024-11-29

## 2024-12-04 ENCOUNTER — TELEPHONE (OUTPATIENT)
Dept: NEPHROLOGY | Age: 29
End: 2024-12-04

## 2024-12-04 DIAGNOSIS — N02.B9 IGA NEPHROPATHY: Primary | ICD-10-CM

## 2024-12-04 DIAGNOSIS — R31.9 HEMATURIA, UNSPECIFIED TYPE: ICD-10-CM

## 2024-12-04 DIAGNOSIS — R80.9 PROTEINURIA, UNSPECIFIED TYPE: ICD-10-CM

## 2024-12-04 DIAGNOSIS — I10 PRIMARY HYPERTENSION: ICD-10-CM

## 2024-12-04 DIAGNOSIS — E78.5 DYSLIPIDEMIA: ICD-10-CM

## 2024-12-04 NOTE — TELEPHONE ENCOUNTER
Pt phoned has not took his prednisone since 11-27 because he ran out now he is asking for a script - do we give a script for 2 mg or 1mg or stop it all together per pt?

## 2024-12-04 NOTE — TELEPHONE ENCOUNTER
He was on prednisone 2 mg   Since he has not taken any prednisone in over a week, ok to stop at this time  Check UPCR in 4 weeks.

## 2024-12-05 NOTE — TELEPHONE ENCOUNTER
Spoke with patient.  He wanted me to ask Dr. Matt if he should still take the PEPCID?   He said he was worried about it countering the prednisone.

## 2025-01-06 LAB
A/G RATIO: NORMAL
ALBUMIN: 4.6 G/DL
ALP BLD-CCNC: 88 U/L
ALT SERPL-CCNC: 17 U/L
AST SERPL-CCNC: 15 U/L
BILIRUB SERPL-MCNC: 0.3 MG/DL (ref 0.1–1.4)
BILIRUBIN DIRECT: 0.2 MG/DL
BILIRUBIN, INDIRECT: NORMAL
GLOBULIN: NORMAL
TOTAL PROTEIN: 6.9 G/DL (ref 6.4–8.2)

## 2025-02-13 LAB
ALBUMIN: 5.3 G/DL
ALP BLD-CCNC: 104 U/L
ALT SERPL-CCNC: 18 U/L
ANION GAP SERPL CALCULATED.3IONS-SCNC: 13 MMOL/L
AST SERPL-CCNC: 16 U/L
BASOPHILS ABSOLUTE: NORMAL
BASOPHILS RELATIVE PERCENT: NORMAL
BILIRUB SERPL-MCNC: 1.1 MG/DL (ref 0.1–1.4)
BILIRUBIN, URINE: NEGATIVE
BLOOD, URINE: POSITIVE
BUN BLDV-MCNC: 13 MG/DL
CALCIUM SERPL-MCNC: 10.2 MG/DL
CHLORIDE BLD-SCNC: 101 MMOL/L
CHOLESTEROL, TOTAL: 115 MG/DL
CHOLESTEROL/HDL RATIO: NORMAL
CLARITY, UA: CLEAR
CO2: 27 MMOL/L
COLOR, UA: YELLOW
CREAT SERPL-MCNC: 1.1 MG/DL
EOSINOPHILS ABSOLUTE: NORMAL
EOSINOPHILS RELATIVE PERCENT: NORMAL
GFR, ESTIMATED: 89
GLUCOSE BLD-MCNC: 77 MG/DL
GLUCOSE URINE: >=1000
HCT VFR BLD CALC: 50.8 % (ref 41–53)
HDLC SERPL-MCNC: 43 MG/DL (ref 35–70)
HEMOGLOBIN: 16.3 G/DL (ref 13.5–17.5)
KETONES, URINE: POSITIVE
LDL CHOLESTEROL: 53
LEUKOCYTE ESTERASE, URINE: NEGATIVE
LYMPHOCYTES ABSOLUTE: NORMAL
LYMPHOCYTES RELATIVE PERCENT: NORMAL
MCH RBC QN AUTO: NORMAL PG
MCHC RBC AUTO-ENTMCNC: NORMAL G/DL
MCV RBC AUTO: NORMAL FL
MONOCYTES ABSOLUTE: NORMAL
MONOCYTES RELATIVE PERCENT: NORMAL
NEUTROPHILS ABSOLUTE: NORMAL
NEUTROPHILS RELATIVE PERCENT: NORMAL
NITRITE, URINE: NEGATIVE
NONHDLC SERPL-MCNC: NORMAL MG/DL
PH UA: 6 (ref 4.5–8)
PLATELET # BLD: 224 K/ΜL
PMV BLD AUTO: NORMAL FL
POTASSIUM SERPL-SCNC: 4.1 MMOL/L
PROTEIN UA: POSITIVE
RBC # BLD: 6.04 10^6/ΜL
SODIUM BLD-SCNC: 141 MMOL/L
SPECIFIC GRAVITY UA: 1.03 (ref 1–1.03)
TOTAL PROTEIN: 8.3 G/DL (ref 6.4–8.2)
TRIGL SERPL-MCNC: 93 MG/DL
UROBILINOGEN, URINE: ABNORMAL
VITAMIN D 25-HYDROXY: 25
VITAMIN D2, 25 HYDROXY: NORMAL
VITAMIN D3,25 HYDROXY: NORMAL
VLDLC SERPL CALC-MCNC: 19 MG/DL
WBC # BLD: 10.2 10^3/ML

## 2025-02-14 ENCOUNTER — OFFICE VISIT (OUTPATIENT)
Dept: NEPHROLOGY | Age: 30
End: 2025-02-14
Payer: COMMERCIAL

## 2025-02-14 VITALS
HEART RATE: 64 BPM | HEIGHT: 65 IN | DIASTOLIC BLOOD PRESSURE: 77 MMHG | BODY MASS INDEX: 33.82 KG/M2 | SYSTOLIC BLOOD PRESSURE: 115 MMHG | OXYGEN SATURATION: 100 % | WEIGHT: 203 LBS

## 2025-02-14 DIAGNOSIS — I10 PRIMARY HYPERTENSION: ICD-10-CM

## 2025-02-14 DIAGNOSIS — E55.9 VITAMIN D DEFICIENCY: ICD-10-CM

## 2025-02-14 DIAGNOSIS — N02.B9 IGA NEPHROPATHY: Primary | ICD-10-CM

## 2025-02-14 PROCEDURE — 99213 OFFICE O/P EST LOW 20 MIN: CPT | Performed by: INTERNAL MEDICINE

## 2025-02-14 PROCEDURE — 3078F DIAST BP <80 MM HG: CPT | Performed by: INTERNAL MEDICINE

## 2025-02-14 PROCEDURE — 3074F SYST BP LT 130 MM HG: CPT | Performed by: INTERNAL MEDICINE

## 2025-02-14 RX ORDER — DEXTROAMPHETAMINE SACCHARATE, AMPHETAMINE ASPARTATE, DEXTROAMPHETAMINE SULFATE AND AMPHETAMINE SULFATE 1.25; 1.25; 1.25; 1.25 MG/1; MG/1; MG/1; MG/1
TABLET ORAL
COMMUNITY
Start: 2025-01-10

## 2025-02-14 RX ORDER — ERGOCALCIFEROL 1.25 MG/1
50000 CAPSULE, LIQUID FILLED ORAL WEEKLY
Qty: 12 CAPSULE | Refills: 0 | Status: SHIPPED | OUTPATIENT
Start: 2025-02-14

## 2025-02-14 NOTE — PROGRESS NOTES
[facility] [place] [facility]  [facility] [place] [facility]  [address]  [facility] [address]  Dept: [phone]  Loc: [phone]  Office Progress Note  2/14/2025 10:38 AM      Pt Name:    Hero Blanchard  YOB: 1995  Primary Care Physician:  Hong Melo MD     Chief Complaint:   Chief Complaint   Patient presents with    Follow-up     For IgA nephropathy        Background Information/Interval History:   The patient is a 29 y.o. white male patient with proteinuria and hematuria. Patient reports history of hypertension for few years. He says atleast 2 years. He says mother has high BP.      Jan 2024: Here for follow-up.  Feels well overall. No chest pain or shortness of breath. Feels better infact. He says his energy level is better. No joint pains. Seeing PCP and had skin biopsy- I dont have the results. I've asked him to get us the biopsy report. Also seeing dermatology.     March 2024: He still has not dropped of the biopsy report. He says he has it at home. Prednisone was started in July 2023. He was started on 60 mg daily and now has been tapered down to 5 mg daily.     July 2024: Feels well. No active or new rashes. No leg swelling. Ran out of Bp medications but did not call us. He says \"I procrastinated.\" Also add he is planning to lose weight. He says he ran out of prednisone, Farxiga, Clonidine and coreg. He ran out of losartan.    Sept 2024: Feeling better. Losing weight. Taking Farxiga. No urinary complaints. On Prednisone 4 mg daily.    BP is stable here but he does not check BP. He says \"I forget to check BP at home.\"     Nov 2024: Pt is here for follow-up. He feels okay. No urinary complaints. No chest pain or shortness of breath.   Has not been checking BP at home.     Feb 2025: seeing for 3 months follow-up. Says he was not able to lose weight over last 3 months.     Past History:  Past Medical History:   Diagnosis Date    Diabetes mellitus (HCC)

## 2025-03-05 RX ORDER — ATORVASTATIN CALCIUM 20 MG/1
20 TABLET, FILM COATED ORAL DAILY
Qty: 30 TABLET | Refills: 3 | Status: SHIPPED | OUTPATIENT
Start: 2025-03-05

## 2025-05-08 LAB
ALBUMIN: 4.6 G/DL
ALP BLD-CCNC: 89 U/L
ALT SERPL-CCNC: 17 U/L
ANION GAP SERPL CALCULATED.3IONS-SCNC: NORMAL MMOL/L
AST SERPL-CCNC: 14 U/L
BASOPHILS ABSOLUTE: 0 /ΜL
BASOPHILS RELATIVE PERCENT: 0.5 %
BILIRUB SERPL-MCNC: 0.3 MG/DL (ref 0.1–1.4)
BILIRUBIN, URINE: NEGATIVE
BLOOD, URINE: POSITIVE
BUN BLDV-MCNC: 21 MG/DL
CALCIUM SERPL-MCNC: 9.5 MG/DL
CHLORIDE BLD-SCNC: 106 MMOL/L
CHOLESTEROL, TOTAL: 122 MG/DL
CHOLESTEROL/HDL RATIO: 47
CLARITY, UA: ABNORMAL
CO2: 23 MMOL/L
COLOR, UA: YELLOW
CREAT SERPL-MCNC: 1 MG/DL
EOSINOPHILS ABSOLUTE: 0.3 /ΜL
EOSINOPHILS RELATIVE PERCENT: 4.8 %
GFR, ESTIMATED: >90
GLUCOSE BLD-MCNC: 80 MG/DL
GLUCOSE URINE: ABNORMAL
HCT VFR BLD CALC: 45.2 % (ref 41–53)
HDLC SERPL-MCNC: 47 MG/DL (ref 35–70)
HEMOGLOBIN: 14.8 G/DL (ref 13.5–17.5)
KETONES, URINE: NEGATIVE
LDL CHOLESTEROL: 52
LEUKOCYTE ESTERASE, URINE: NEGATIVE
LYMPHOCYTES ABSOLUTE: 2.2 /ΜL
LYMPHOCYTES RELATIVE PERCENT: 34.1 %
MCH RBC QN AUTO: 27.1 PG
MCHC RBC AUTO-ENTMCNC: 32.7 G/DL
MCV RBC AUTO: 82.8 FL
MONOCYTES ABSOLUTE: 0.4 /ΜL
MONOCYTES RELATIVE PERCENT: 6.3 %
NEUTROPHILS ABSOLUTE: 3.5 /ΜL
NEUTROPHILS RELATIVE PERCENT: 53.4 %
NITRITE, URINE: NEGATIVE
NONHDLC SERPL-MCNC: NORMAL MG/DL
PH UA: 6.5 (ref 4.5–8)
PLATELET # BLD: 231 K/ΜL
PMV BLD AUTO: 8.9 FL
POTASSIUM SERPL-SCNC: 4.4 MMOL/L
PROTEIN UA: POSITIVE
RBC # BLD: 5.46 10^6/ΜL
SODIUM BLD-SCNC: 142 MMOL/L
SPECIFIC GRAVITY UA: 1.02 (ref 1–1.03)
TOTAL PROTEIN: 7.2 G/DL (ref 6.4–8.2)
TRIGL SERPL-MCNC: 114 MG/DL
UROBILINOGEN, URINE: NORMAL
VITAMIN D 25-HYDROXY: 23
VITAMIN D2, 25 HYDROXY: NORMAL
VITAMIN D3,25 HYDROXY: NORMAL
VLDLC SERPL CALC-MCNC: 23 MG/DL
WBC # BLD: 6.5 10^3/ML

## 2025-05-09 ENCOUNTER — OFFICE VISIT (OUTPATIENT)
Dept: NEPHROLOGY | Age: 30
End: 2025-05-09
Payer: COMMERCIAL

## 2025-05-09 VITALS
BODY MASS INDEX: 33.99 KG/M2 | HEIGHT: 65 IN | OXYGEN SATURATION: 100 % | HEART RATE: 66 BPM | DIASTOLIC BLOOD PRESSURE: 85 MMHG | SYSTOLIC BLOOD PRESSURE: 124 MMHG | WEIGHT: 204 LBS

## 2025-05-09 DIAGNOSIS — N02.B9 IGA NEPHROPATHY: Primary | ICD-10-CM

## 2025-05-09 DIAGNOSIS — I10 PRIMARY HYPERTENSION: ICD-10-CM

## 2025-05-09 PROCEDURE — 3074F SYST BP LT 130 MM HG: CPT | Performed by: INTERNAL MEDICINE

## 2025-05-09 PROCEDURE — 3079F DIAST BP 80-89 MM HG: CPT | Performed by: INTERNAL MEDICINE

## 2025-05-09 PROCEDURE — 99213 OFFICE O/P EST LOW 20 MIN: CPT | Performed by: INTERNAL MEDICINE

## 2025-05-09 NOTE — PROGRESS NOTES
[place] [facility] [place] [facility]  [place] [facility]  [address]  [facility] [address]  Dept: [phone]  Loc: [phone]  Office Progress Note  5/9/2025 11:40 AM      Pt Name:    Hero Blanchard  YOB: 1995  Primary Care Physician:  Hong Melo MD     Chief Complaint:   Chief Complaint   Patient presents with    Follow-up     For IgA nephropathy        Background Information/Interval History:   The patient is a 30 y.o. white male patient with proteinuria and hematuria. Patient reports history of hypertension for few years. He says atleast 2 years. He says mother has high BP.      Jan 2024: Here for follow-up.  Feels well overall. No chest pain or shortness of breath. Feels better infact. He says his energy level is better. No joint pains. Seeing PCP and had skin biopsy- I dont have the results. I've asked him to get us the biopsy report. Also seeing dermatology.     March 2024: He still has not dropped of the biopsy report. He says he has it at home. Prednisone was started in July 2023. He was started on 60 mg daily and now has been tapered down to 5 mg daily.     July 2024: Feels well. No active or new rashes. No leg swelling. Ran out of Bp medications but did not call us. He says \"I procrastinated.\" Also add he is planning to lose weight. He says he ran out of prednisone, Farxiga, Clonidine and coreg. He ran out of losartan.    Sept 2024: Feeling better. Losing weight. Taking Farxiga. No urinary complaints. On Prednisone 4 mg daily.    BP is stable here but he does not check BP. He says \"I forget to check BP at home.\"     Nov 2024: Pt is here for follow-up. He feels okay. No urinary complaints. No chest pain or shortness of breath.   Has not been checking BP at home.     Feb 2025: seeing for 3 months follow-up. Says he was not able to lose weight over last 3 months.   May 2025: no new complaints. Leg swelling is better. No urinary complaints.     Past

## 2025-05-12 RX ORDER — ERGOCALCIFEROL 1.25 MG/1
50000 CAPSULE, LIQUID FILLED ORAL WEEKLY
Qty: 12 CAPSULE | Refills: 0 | Status: SHIPPED | OUTPATIENT
Start: 2025-05-12

## 2025-06-02 RX ORDER — ERGOCALCIFEROL 1.25 MG/1
50000 CAPSULE, LIQUID FILLED ORAL WEEKLY
Qty: 12 CAPSULE | Refills: 3 | OUTPATIENT
Start: 2025-06-02

## 2025-07-21 RX ORDER — CARVEDILOL 25 MG/1
25 TABLET ORAL 2 TIMES DAILY
Qty: 180 TABLET | Refills: 3 | Status: SHIPPED | OUTPATIENT
Start: 2025-07-21

## 2025-08-01 DIAGNOSIS — N02.B9 IGA NEPHROPATHY: Primary | ICD-10-CM

## 2025-08-04 RX ORDER — ATORVASTATIN CALCIUM 20 MG/1
20 TABLET, FILM COATED ORAL DAILY
Qty: 30 TABLET | Refills: 3 | Status: SHIPPED | OUTPATIENT
Start: 2025-08-04

## 2025-08-05 LAB
ALBUMIN: 4.5 G/DL
ALP BLD-CCNC: 97 U/L
ALT SERPL-CCNC: 18 U/L
ANION GAP SERPL CALCULATED.3IONS-SCNC: 11 MMOL/L
AST SERPL-CCNC: 15 U/L
BILIRUB SERPL-MCNC: 0.4 MG/DL (ref 0.1–1.4)
BUN BLDV-MCNC: 19 MG/DL
CALCIUM SERPL-MCNC: 9.4 MG/DL
CHLORIDE BLD-SCNC: 102 MMOL/L
CO2: 27 MMOL/L
CREAT SERPL-MCNC: 1 MG/DL
GFR, ESTIMATED: >=90
GLUCOSE BLD-MCNC: 87 MG/DL
MICROSCOPIC URINE: NORMAL
POTASSIUM SERPL-SCNC: 4.1 MMOL/L
SODIUM BLD-SCNC: 140 MMOL/L
TOTAL PROTEIN: 7 G/DL (ref 6.4–8.2)

## 2025-08-08 ENCOUNTER — OFFICE VISIT (OUTPATIENT)
Dept: NEPHROLOGY | Age: 30
End: 2025-08-08
Payer: COMMERCIAL

## 2025-08-08 VITALS
WEIGHT: 210 LBS | OXYGEN SATURATION: 99 % | HEART RATE: 70 BPM | SYSTOLIC BLOOD PRESSURE: 134 MMHG | HEIGHT: 65 IN | BODY MASS INDEX: 34.99 KG/M2 | DIASTOLIC BLOOD PRESSURE: 92 MMHG

## 2025-08-08 DIAGNOSIS — N02.B9 IGA NEPHROPATHY: Primary | ICD-10-CM

## 2025-08-08 DIAGNOSIS — I10 PRIMARY HYPERTENSION: ICD-10-CM

## 2025-08-08 PROCEDURE — 99213 OFFICE O/P EST LOW 20 MIN: CPT | Performed by: INTERNAL MEDICINE

## 2025-08-08 PROCEDURE — 3075F SYST BP GE 130 - 139MM HG: CPT | Performed by: INTERNAL MEDICINE

## 2025-08-08 PROCEDURE — 3080F DIAST BP >= 90 MM HG: CPT | Performed by: INTERNAL MEDICINE

## 2025-08-08 RX ORDER — ERGOCALCIFEROL 1.25 MG/1
50000 CAPSULE, LIQUID FILLED ORAL WEEKLY
Qty: 12 CAPSULE | Refills: 0 | Status: SHIPPED | OUTPATIENT
Start: 2025-08-08

## 2025-08-11 ENCOUNTER — TELEPHONE (OUTPATIENT)
Dept: NEPHROLOGY | Age: 30
End: 2025-08-11

## 2025-08-11 DIAGNOSIS — N02.B9 IGA NEPHROPATHY: Primary | ICD-10-CM
